# Patient Record
Sex: FEMALE | Race: WHITE | NOT HISPANIC OR LATINO | Employment: UNEMPLOYED | URBAN - NONMETROPOLITAN AREA
[De-identification: names, ages, dates, MRNs, and addresses within clinical notes are randomized per-mention and may not be internally consistent; named-entity substitution may affect disease eponyms.]

---
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
## 2019-03-26 DIAGNOSIS — F98.8 ATTENTION DEFICIT DISORDER (ADD) WITHOUT HYPERACTIVITY: ICD-10-CM

## 2019-03-26 RX ORDER — LEVONORGESTREL AND ETHINYL ESTRADIOL 0.1-0.02MG
1 KIT ORAL DAILY
Qty: 1 PACKET | Refills: 5 | Status: SHIPPED | OUTPATIENT
Start: 2019-03-26 | End: 2019-05-09 | Stop reason: SDUPTHER

## 2019-05-10 RX ORDER — LEVONORGESTREL AND ETHINYL ESTRADIOL 0.1-0.02MG
1 KIT ORAL DAILY
Qty: 1 PACKET | Refills: 3 | Status: SHIPPED | OUTPATIENT
Start: 2019-05-10 | End: 2019-05-14 | Stop reason: SDUPTHER

## 2019-05-13 ENCOUNTER — OFFICE VISIT (OUTPATIENT)
Dept: INTERNAL MEDICINE | Age: 19
End: 2019-05-13
Payer: COMMERCIAL

## 2019-05-13 VITALS
WEIGHT: 144 LBS | OXYGEN SATURATION: 99 % | HEIGHT: 63 IN | HEART RATE: 63 BPM | SYSTOLIC BLOOD PRESSURE: 100 MMHG | BODY MASS INDEX: 25.52 KG/M2 | RESPIRATION RATE: 18 BRPM | DIASTOLIC BLOOD PRESSURE: 62 MMHG

## 2019-05-13 DIAGNOSIS — F98.8 ATTENTION DEFICIT DISORDER (ADD) WITHOUT HYPERACTIVITY: Primary | ICD-10-CM

## 2019-05-13 LAB
AMPHETAMINE SCREEN, URINE: NORMAL
BARBITURATE SCREEN, URINE: NORMAL
BENZODIAZEPINE SCREEN, URINE: NORMAL
BUPRENORPHINE URINE: NORMAL
COCAINE METABOLITE SCREEN URINE: NORMAL
GABAPENTIN SCREEN, URINE: NORMAL
MDMA URINE: NORMAL
METHADONE SCREEN, URINE: NORMAL
METHAMPHETAMINE, URINE: NORMAL
OPIATE SCREEN URINE: NORMAL
OXYCODONE SCREEN URINE: NORMAL
PHENCYCLIDINE SCREEN URINE: NORMAL
PROPOXYPHENE SCREEN, URINE: NORMAL
THC SCREEN, URINE: NORMAL
TRICYCLIC ANTIDEPRESSANTS, UR: NORMAL

## 2019-05-13 PROCEDURE — 99213 OFFICE O/P EST LOW 20 MIN: CPT | Performed by: INTERNAL MEDICINE

## 2019-05-13 PROCEDURE — 80305 DRUG TEST PRSMV DIR OPT OBS: CPT | Performed by: INTERNAL MEDICINE

## 2019-05-13 ASSESSMENT — ENCOUNTER SYMPTOMS
CONSTIPATION: 0
COUGH: 0
ABDOMINAL PAIN: 0
SORE THROAT: 0
CHEST TIGHTNESS: 0
WHEEZING: 0

## 2019-05-13 NOTE — LETTER
disease. Overdose or dangerous interactions with alcohol and other medications may occur, leading to death. Hyperalgesia may develop, in which patients receiving opioids for the treatment of pain may actually become more sensitive to certain painful stimuli, and in some cases, experience pain from ordinarily non-painful stimuli. Women between the ages of 14-53 who could become pregnant should carefully weigh the risks and benefits of opioids with their physicians, as these medications increase the risk of pregnancy complications, including miscarriage,  delivery and stillbirth. It is also possible for babies to be born addicted to opioids. Opioid dependence withdrawal symptoms may include; feelings of uneasiness, increased pain, irritability, belly pain, diarrhea, sweats and goose-flesh. Benzodiazepines and non-benzodiazepine sleep medications: These medications can lead to problems such as addiction/dependence, sedation, fatigue, lightheadedness, dizziness, incoordination, falls, depression, hallucinations, and impaired judgment, memory and concentration. The ability to drive and operate machinery may also be affected. Abnormal sleep-related behaviors have been reported, including sleep walking, driving, making telephone calls, eating, or having sex while not fully awake. These medications can suppress breathing and worsen sleep apnea, particularly when combined with alcohol or other sedating medications, potentially leading to death. Dependence withdrawal symptoms may include tremors, anxiety, hallucinations and seizures. Stimulants:  Common adverse effects include addiction/dependence, increased blood pressure and heart rate, decreased appetite, nausea, involuntary weight loss, insomnia, irritability, and headaches.   These risks may increase when these medications are combined with other stimulants, such as caffeine pills or energy drinks, certain weight loss · I will actively participate in any program designed to improve function, including social, physical, psychological and daily or work activities. 2. I will not use illegal or street drugs or another person's prescription. If I have an addiction problem with drugs or alcohol and my provider asks me to enter a program to address this issue, I agree to follow through. Such programs may include:  · 12-Step program and securing a sponsor  · Individual counseling   · Inpatient or outpatient treatment  · Other:_____________________________________________________________________________________________________________________________________________    If in treatment, I will request that a copy of the programs initial evaluation and treatment recommendations be sent to this provider and will not expect refills until that is received. I will also request written monthly updates be sent to this provider to verify my continuing treatment. 3. I will consent to drug screening upon my providers request to assure I am only taking the prescribed drugs, described in this MEDICATION AGREEMENT. I understand that a drug screen is a laboratory test in which a sample of my urine, blood or saliva is checked to see what drugs I have been taking. 4. I agree that I will treat the providers and staff at this office with respect at all times. I will keep all of my scheduled appointments, but if I need to cancel my appointment, I will do so a minimum of 24 hours before it is scheduled. 5. I understand that this provider may stop prescribing the medications listed if:  · I do not show any improvement in pain, or my activity has not improved. · I develop rapid tolerance or loss of improvement, as described in my treatment plan. · I develop significant side effects from the medication.   · My behavior is inconsistent with the responsibilities outlined above, which may also result in my being prevented from receiving further care from this office. · Other:____________________________________________________________________    AGREEMENT:    I have read the above and have had all of my questions answered. For chronic disease management, I know that my symptoms can be managed with many types of treatments. A chronic medication trial may be part of my treatment, but I must be an active participant in my care. Medication therapy is only one part of my symptom management plan. In some cases, there may be limited scientific evidence to support the chronic use of certain medications to improve symptoms and daily function. Furthermore, in certain circumstances, there may be scientific information that suggests that use of chronic controlled substances may actually worsen my symptoms and increase my risk of unintentional death directly related to this medication therapy. I know that if my provider feels my risk from controlled medications is greater than my benefit, I will have my controlled substance medication(s) compassionately lowered or removed altogether. I agree to a controlled substance medication trial.      I further agree to allow this office to contact my HIPAA contact on file if there are concerns about my safety and use of controlled medications. I have agreed to use the following medications above as instructed by my physician and as stated in this [place] [address].      Patient Signature:  ______________________  Date:5/13/2019 or _____________    Provider Signature:______________________  Date:5/13/2019 or _____________

## 2019-05-13 NOTE — PROGRESS NOTES
Chief Complaint:   Loi Nathan is a 22 y.o. female who presents forcomplete physical exam.    History of Present Illness:      Loi Nathan is a 22 y.o. female who presents todayfor wellness visit AND follow up on her chronic medical conditions as noted below. Here FU ADD  Doing well at school  Vyvanse is helping      Patient Active Problem List    Diagnosis Date Noted    Attention deficit disorder (ADD) without hyperactivity 08/09/2018    Need for vaccination 08/09/2018       No past medical history on file. Past Surgical History:   Procedure Laterality Date    ARM SURGERY      FEMUR FRACTURE SURGERY  2014    fell from horse       Current Outpatient Medications   Medication Sig Dispense Refill    Lisdexamfetamine Dimesylate (VYVANSE) 40 MG CAPS Take 40 mg by mouth daily for 31 days. 30 capsule 0    levonorgestrel-ethinyl estradiol (LESSINA) 0.1-20 MG-MCG per tablet Take 1 tablet by mouth daily 1 packet 3     No current facility-administered medications for this visit. No Known Allergies    Social History     Socioeconomic History    Marital status: Single     Spouse name: None    Number of children: None    Years of education: None    Highest education level: None   Occupational History    None   Social Needs    Financial resource strain: None    Food insecurity:     Worry: None     Inability: None    Transportation needs:     Medical: None     Non-medical: None   Tobacco Use    Smoking status: Never Smoker    Smokeless tobacco: Never Used   Substance and Sexual Activity    Alcohol use:  Yes    Drug use: No    Sexual activity: Never   Lifestyle    Physical activity:     Days per week: None     Minutes per session: None    Stress: None   Relationships    Social connections:     Talks on phone: None     Gets together: None     Attends Shinto service: None     Active member of club or organization: None     Attends meetings of clubs or organizations: None     Relationship status: None    Intimate partner violence:     Fear of current or ex partner: None     Emotionally abused: None     Physically abused: None     Forced sexual activity: None   Other Topics Concern    None   Social History Narrative    None     No family history on file. Past Surgical History:   Procedure Laterality Date    ARM SURGERY      FEMUR FRACTURE SURGERY  2014    fell from horse         Lab Review   No visits with results within 2 Month(s) from this visit. Latest known visit with results is:   Orders Only on 08/09/2018   Component Date Value    Sodium 08/09/2018 142     Potassium 08/09/2018 4.8     Chloride 08/09/2018 101     CO2 08/09/2018 27     Anion Gap 08/09/2018 14     Glucose 08/09/2018 91     BUN 08/09/2018 7     CREATININE 08/09/2018 0.6     GFR Non- 08/09/2018 >60     Calcium 08/09/2018 9.9     Total Protein 08/09/2018 8.0     Alb 08/09/2018 4.7     Total Bilirubin 08/09/2018 <0.2     Alkaline Phosphatase 08/09/2018 85     ALT 08/09/2018 10     AST 08/09/2018 14     WBC 08/09/2018 12.0*    RBC 08/09/2018 5.11     Hemoglobin 08/09/2018 13.9     Hematocrit 08/09/2018 44.3     MCV 08/09/2018 86.7     MCH 08/09/2018 27.2     MCHC 08/09/2018 31.4*    RDW 08/09/2018 13.0     Platelets 52/20/4615 320     MPV 08/09/2018 9.3*         Review of Systems   Constitutional: Negative for chills, fatigue and fever. HENT: Negative for congestion, ear pain, nosebleeds, postnasal drip and sore throat. Respiratory: Negative for cough, chest tightness and wheezing. Cardiovascular: Negative for chest pain, palpitations and leg swelling. Gastrointestinal: Negative for abdominal pain and constipation. Genitourinary: Negative for dysuria and urgency. Musculoskeletal: Negative. Negative for arthralgias. Skin: Negative for rash. Neurological: Negative for dizziness and headaches. Psychiatric/Behavioral: Negative.            Vitals:    05/13/19 1119   BP: 100/62   Site: Left Upper Arm   Position: Sitting   Cuff Size: Large Adult   Pulse: 63   Resp: 18   SpO2: 99%   Weight: 144 lb (65.3 kg)   Height: 5' 3\" (1.6 m)      Wt Readings from Last 3 Encounters:   05/13/19 144 lb (65.3 kg) (75 %, Z= 0.67)*   12/14/18 140 lb (63.5 kg) (71 %, Z= 0.57)*   08/09/18 149 lb (67.6 kg) (82 %, Z= 0.91)*     * Growth percentiles are based on CDC (Girls, 2-20 Years) data. Body mass index is 25.51 kg/m². BP Readings from Last 3 Encounters:   05/13/19 100/62   12/14/18 110/70   08/09/18 100/60       Physical Exam   Constitutional: She is oriented to person, place, and time. She appears well-developed and well-nourished. HENT:   Right Ear: External ear normal.   Left Ear: External ear normal.   Mouth/Throat: Oropharynx is clear and moist. No oropharyngeal exudate. Eyes: Pupils are equal, round, and reactive to light. Conjunctivae are normal.   Neck: Neck supple. No JVD present. No thyromegaly present. Cardiovascular: Normal rate and normal heart sounds. No murmur heard. Pulmonary/Chest: Breath sounds normal. No respiratory distress. She has no wheezes. She has no rales. She exhibits no tenderness. Abdominal: Soft. Bowel sounds are normal.   Musculoskeletal: Normal range of motion. Lymphadenopathy:     She has no cervical adenopathy. Neurological: She is oriented to person, place, and time. Skin: Skin is warm. No rash noted. ASSESSMENT/PLAN    Attention deficit disorder (ADD) without hyperactivity  -     POCT Rapid Drug Screen today- negative ( pt has been   out of rx over one week)  Gianna Shannon reviewed  RX  -     Lisdexamfetamine Dimesylate (VYVANSE) 40 MG CAPS; Take 40 mg by mouth daily for 31 days. .     Other orders  -     levonorgestrel-ethinyl estradiol (LESSINA) 0.1-20 MG-MCG per tablet;  Take 1 tablet by mouth daily            Orders Placed This Encounter   Procedures    POCT Rapid Drug Screen     New Prescriptions    No medications on file      There are no Patient Instructions on file for this visit. Return in about 3 months (around 8/13/2019). EMR Dragon/transcription disclaimer:Significant part of this  encounter note is electronic transcription/translation of spoken language to printed text. The electronic translation of spoken language may beerroneous, or at times, nonsensical words or phrases may be inadvertently transcribed.  Although I have reviewed the note for such errors, some may still exist.

## 2019-05-14 RX ORDER — LEVONORGESTREL AND ETHINYL ESTRADIOL 0.1-0.02MG
1 KIT ORAL DAILY
Qty: 1 PACKET | Refills: 3 | Status: SHIPPED | OUTPATIENT
Start: 2019-05-14 | End: 2019-06-07 | Stop reason: SDUPTHER

## 2019-06-10 RX ORDER — LEVONORGESTREL AND ETHINYL ESTRADIOL 0.1-0.02MG
1 KIT ORAL DAILY
Qty: 1 PACKET | Refills: 3 | Status: SHIPPED | OUTPATIENT
Start: 2019-06-10 | End: 2019-11-22 | Stop reason: SDUPTHER

## 2019-07-08 ENCOUNTER — OFFICE VISIT (OUTPATIENT)
Dept: INTERNAL MEDICINE | Age: 19
End: 2019-07-08
Payer: COMMERCIAL

## 2019-07-08 VITALS
HEIGHT: 63 IN | OXYGEN SATURATION: 96 % | HEART RATE: 99 BPM | SYSTOLIC BLOOD PRESSURE: 100 MMHG | BODY MASS INDEX: 27.11 KG/M2 | DIASTOLIC BLOOD PRESSURE: 58 MMHG | RESPIRATION RATE: 18 BRPM | WEIGHT: 153 LBS

## 2019-07-08 DIAGNOSIS — R49.0 HOARSE: ICD-10-CM

## 2019-07-08 DIAGNOSIS — J02.9 SORE THROAT: Primary | ICD-10-CM

## 2019-07-08 DIAGNOSIS — J02.9 ACUTE PHARYNGITIS, UNSPECIFIED ETIOLOGY: ICD-10-CM

## 2019-07-08 PROCEDURE — 96372 THER/PROPH/DIAG INJ SC/IM: CPT | Performed by: INTERNAL MEDICINE

## 2019-07-08 PROCEDURE — 99213 OFFICE O/P EST LOW 20 MIN: CPT | Performed by: INTERNAL MEDICINE

## 2019-07-08 PROCEDURE — 87880 STREP A ASSAY W/OPTIC: CPT | Performed by: INTERNAL MEDICINE

## 2019-07-08 RX ORDER — METHYLPREDNISOLONE ACETATE 40 MG/ML
40 INJECTION, SUSPENSION INTRA-ARTICULAR; INTRALESIONAL; INTRAMUSCULAR; SOFT TISSUE ONCE
Status: COMPLETED | OUTPATIENT
Start: 2019-07-08 | End: 2019-07-08

## 2019-07-08 RX ADMIN — METHYLPREDNISOLONE ACETATE 40 MG: 40 INJECTION, SUSPENSION INTRA-ARTICULAR; INTRALESIONAL; INTRAMUSCULAR; SOFT TISSUE at 13:27

## 2019-07-08 ASSESSMENT — ENCOUNTER SYMPTOMS
SORE THROAT: 1
ABDOMINAL PAIN: 0
CONSTIPATION: 0
CHEST TIGHTNESS: 0
WHEEZING: 0
COUGH: 0
VOICE CHANGE: 1
TROUBLE SWALLOWING: 1

## 2019-07-08 NOTE — PROGRESS NOTES
Chief Complaint:   Jahaira Rodriguez is a 22 y.o. female  [address] [place]   Chief Complaint   Patient presents with    Pharyngitis     started 3 days ago   . History of Present Illness:      Presents today with complaints of sore throat, voice changes, difficult to swallow some congestion  Low-grade fever  No chills  No cough  Patient Active Problem List    Diagnosis Date Noted    Attention deficit disorder (ADD) without hyperactivity 08/09/2018    Need for vaccination 08/09/2018       No past medical history on file. Past Surgical History:   Procedure Laterality Date    ARM SURGERY      FEMUR FRACTURE SURGERY  2014    fell from horse       Current Outpatient Medications   Medication Sig Dispense Refill    levonorgestrel-ethinyl estradiol (LESSINA) 0.1-20 MG-MCG per tablet Take 1 tablet by mouth daily 1 packet 3    Lisdexamfetamine Dimesylate (VYVANSE) 40 MG CAPS Take 40 mg by mouth daily for 31 days. 30 capsule 0     No current facility-administered medications for this visit. No Known Allergies    Social History     Socioeconomic History    Marital status: Single     Spouse name: None    Number of children: None    Years of education: None    Highest education level: None   Occupational History    None   Social Needs    Financial resource strain: None    Food insecurity:     Worry: None     Inability: None    Transportation needs:     Medical: None     Non-medical: None   Tobacco Use    Smoking status: Never Smoker    Smokeless tobacco: Never Used   Substance and Sexual Activity    Alcohol use:  Yes    Drug use: No    Sexual activity: Never   Lifestyle    Physical activity:     Days per week: None     Minutes per session: None    Stress: None   Relationships    Social connections:     Talks on phone: None     Gets together: None     Attends Mormonism service: None     Active member of club or organization: None     Attends meetings of clubs or organizations: None     Relationship status: None

## 2019-07-09 RX ORDER — AMOXICILLIN 500 MG/1
500 CAPSULE ORAL 3 TIMES DAILY
Qty: 21 CAPSULE | Refills: 0 | Status: SHIPPED | OUTPATIENT
Start: 2019-07-09 | End: 2019-07-16

## 2019-08-09 ENCOUNTER — OFFICE VISIT (OUTPATIENT)
Dept: INTERNAL MEDICINE | Age: 19
End: 2019-08-09
Payer: COMMERCIAL

## 2019-08-09 VITALS
HEART RATE: 89 BPM | DIASTOLIC BLOOD PRESSURE: 80 MMHG | SYSTOLIC BLOOD PRESSURE: 122 MMHG | BODY MASS INDEX: 27.11 KG/M2 | HEIGHT: 63 IN | WEIGHT: 153 LBS | OXYGEN SATURATION: 97 %

## 2019-08-09 DIAGNOSIS — F98.8 ATTENTION DEFICIT DISORDER (ADD) WITHOUT HYPERACTIVITY: Primary | ICD-10-CM

## 2019-08-09 DIAGNOSIS — B00.1 HERPES LABIALIS: ICD-10-CM

## 2019-08-09 PROCEDURE — 99213 OFFICE O/P EST LOW 20 MIN: CPT | Performed by: INTERNAL MEDICINE

## 2019-08-09 RX ORDER — VALACYCLOVIR HYDROCHLORIDE 1 G/1
1000 TABLET, FILM COATED ORAL 3 TIMES DAILY
Qty: 21 TABLET | Refills: 0 | Status: SHIPPED | OUTPATIENT
Start: 2019-08-09 | End: 2020-03-12 | Stop reason: SDUPTHER

## 2019-08-09 ASSESSMENT — ENCOUNTER SYMPTOMS
ABDOMINAL PAIN: 0
WHEEZING: 0
SORE THROAT: 0
CHEST TIGHTNESS: 0
COUGH: 0
CONSTIPATION: 0

## 2019-08-09 ASSESSMENT — PATIENT HEALTH QUESTIONNAIRE - PHQ9
2. FEELING DOWN, DEPRESSED OR HOPELESS: 0
SUM OF ALL RESPONSES TO PHQ9 QUESTIONS 1 & 2: 0
1. LITTLE INTEREST OR PLEASURE IN DOING THINGS: 0
SUM OF ALL RESPONSES TO PHQ QUESTIONS 1-9: 0
SUM OF ALL RESPONSES TO PHQ QUESTIONS 1-9: 0

## 2019-10-21 DIAGNOSIS — F98.8 ATTENTION DEFICIT DISORDER (ADD) WITHOUT HYPERACTIVITY: ICD-10-CM

## 2019-11-22 RX ORDER — LEVONORGESTREL AND ETHINYL ESTRADIOL 0.1-0.02MG
1 KIT ORAL DAILY
Qty: 1 PACKET | Refills: 3 | Status: SHIPPED | OUTPATIENT
Start: 2019-11-22 | End: 2020-03-12 | Stop reason: SDUPTHER

## 2019-11-27 ENCOUNTER — OFFICE VISIT (OUTPATIENT)
Dept: INTERNAL MEDICINE | Age: 19
End: 2019-11-27
Payer: COMMERCIAL

## 2019-11-27 VITALS
OXYGEN SATURATION: 97 % | DIASTOLIC BLOOD PRESSURE: 72 MMHG | RESPIRATION RATE: 18 BRPM | HEART RATE: 89 BPM | HEIGHT: 63 IN | BODY MASS INDEX: 26.22 KG/M2 | SYSTOLIC BLOOD PRESSURE: 108 MMHG | WEIGHT: 148 LBS

## 2019-11-27 DIAGNOSIS — F98.8 ATTENTION DEFICIT DISORDER (ADD) WITHOUT HYPERACTIVITY: ICD-10-CM

## 2019-11-27 PROCEDURE — 99213 OFFICE O/P EST LOW 20 MIN: CPT | Performed by: INTERNAL MEDICINE

## 2019-11-27 ASSESSMENT — ENCOUNTER SYMPTOMS
CONSTIPATION: 0
WHEEZING: 0
COUGH: 0
SORE THROAT: 0
ABDOMINAL PAIN: 0
CHEST TIGHTNESS: 0

## 2020-01-27 ENCOUNTER — TELEPHONE (OUTPATIENT)
Dept: INTERNAL MEDICINE | Age: 20
End: 2020-01-27

## 2020-03-12 ENCOUNTER — OFFICE VISIT (OUTPATIENT)
Dept: INTERNAL MEDICINE | Age: 20
End: 2020-03-12
Payer: COMMERCIAL

## 2020-03-12 VITALS
RESPIRATION RATE: 18 BRPM | HEIGHT: 63 IN | DIASTOLIC BLOOD PRESSURE: 80 MMHG | OXYGEN SATURATION: 99 % | WEIGHT: 142 LBS | BODY MASS INDEX: 25.16 KG/M2 | HEART RATE: 80 BPM | SYSTOLIC BLOOD PRESSURE: 128 MMHG

## 2020-03-12 PROCEDURE — 99213 OFFICE O/P EST LOW 20 MIN: CPT | Performed by: INTERNAL MEDICINE

## 2020-03-12 RX ORDER — VALACYCLOVIR HYDROCHLORIDE 1 G/1
1000 TABLET, FILM COATED ORAL 3 TIMES DAILY
Qty: 21 TABLET | Refills: 0 | Status: SHIPPED | OUTPATIENT
Start: 2020-03-12 | End: 2021-07-26 | Stop reason: SDUPTHER

## 2020-03-12 RX ORDER — LEVONORGESTREL AND ETHINYL ESTRADIOL 0.1-0.02MG
1 KIT ORAL DAILY
Qty: 3 PACKET | Refills: 3 | Status: SHIPPED | OUTPATIENT
Start: 2020-03-12 | End: 2020-09-29 | Stop reason: SDUPTHER

## 2020-03-12 RX ORDER — LISDEXAMFETAMINE DIMESYLATE 40 MG/1
40 CAPSULE ORAL DAILY
Qty: 30 CAPSULE | Refills: 0 | Status: SHIPPED | OUTPATIENT
Start: 2020-03-12 | End: 2020-03-12 | Stop reason: SDUPTHER

## 2020-03-12 RX ORDER — LISDEXAMFETAMINE DIMESYLATE 40 MG/1
40 CAPSULE ORAL DAILY
Qty: 30 CAPSULE | Refills: 0 | Status: SHIPPED | OUTPATIENT
Start: 2020-03-12 | End: 2020-06-25 | Stop reason: SDUPTHER

## 2020-03-12 ASSESSMENT — ENCOUNTER SYMPTOMS
SORE THROAT: 0
COUGH: 0
ABDOMINAL PAIN: 0
WHEEZING: 0
CHEST TIGHTNESS: 0
CONSTIPATION: 0

## 2020-03-12 NOTE — PROGRESS NOTES
Sitting   Cuff Size: Large Adult   Pulse: 80   Resp: 18   SpO2: 99%   Weight: 142 lb (64.4 kg)   Height: 5' 3\" (1.6 m)     Body mass index is 25.15 kg/m². Physical Exam  Constitutional:       Appearance: She is well-developed. HENT:      Right Ear: External ear normal.      Left Ear: External ear normal.      Mouth/Throat:      Pharynx: No oropharyngeal exudate. Eyes:      Conjunctiva/sclera: Conjunctivae normal.      Pupils: Pupils are equal, round, and reactive to light. Neck:      Musculoskeletal: Neck supple. Thyroid: No thyromegaly. Vascular: No JVD. Cardiovascular:      Rate and Rhythm: Normal rate. Heart sounds: Normal heart sounds. No murmur. Pulmonary:      Effort: No respiratory distress. Breath sounds: Normal breath sounds. No wheezing or rales. Chest:      Chest wall: No tenderness. Abdominal:      General: Bowel sounds are normal.      Palpations: Abdomen is soft. Musculoskeletal: Normal range of motion. Lymphadenopathy:      Cervical: No cervical adenopathy. Skin:     General: Skin is warm. Findings: No rash. Neurological:      Mental Status: She is oriented to person, place, and time. Lab Review   No visits with results within 2 Month(s) from this visit.    Latest known visit with results is:   Office Visit on 05/13/2019   Component Date Value    Amphetamine Screen, Urine 05/13/2019 neg     Barbiturate Screen, Urine 05/13/2019 neg     Benzodiazepine Screen, U* 05/13/2019 neg     Buprenorphine Urine 05/13/2019 neg     Cocaine Metabolite Scree* 05/13/2019 neg     Gabapentin Screen, Urine 05/13/2019 neg     MDMA, Urine 05/13/2019 neg     Methamphetamine, Urine 05/13/2019 neg     Methadone Screen, Urine 05/13/2019 neg     Opiate Scrn, Ur 05/13/2019 neg     Oxycodone Screen, Ur 05/13/2019 neg     PCP Screen, Urine 05/13/2019 neg     Propoxyphene Screen, Uri* 05/13/2019 neg     THC Screen, Urine 95/04/4052 neg     Tricyclic Antidepressant* 05/13/2019 neg            ASSESSMENT/PLAN:    Attention deficit disorder (ADD) without hyperactivity  Walker Lyn 03/12/2020  Med Contract 05/13/2019  UDS 02/29/3324    Med Policy 36/91/1316    -     Lisdexamfetamine Dimesylate (VYVANSE) 40 MG CAPS; Take 40 mg by mouth daily for 30 days. Other orders  -     valACYclovir (VALTREX) 1 g tablet; Take 1 tablet by mouth 3 times daily for 21 days  -     levonorgestrel-ethinyl estradiol (LESSINA) 0.1-20 MG-MCG per tablet; Take 1 tablet by mouth daily              No orders of the defined types were placed in this encounter. New Prescriptions    No medications on file         No follow-ups on file. There are no Patient Instructions on file for this visit. EMR Dragon/transcription disclaimer:Significant part of this  encounter note is electronic transcription/translationof spoken language to printed text. The electronic translation of spoken language may be erroneous, or at times, nonsensical words or phrases may be inadvertently transcribed.  Although I have reviewed the note for sucherrors, some may still exist.

## 2020-06-24 ENCOUNTER — TELEPHONE (OUTPATIENT)
Dept: INTERNAL MEDICINE | Age: 20
End: 2020-06-24

## 2020-06-24 RX ORDER — LISDEXAMFETAMINE DIMESYLATE 40 MG/1
40 CAPSULE ORAL DAILY
Qty: 30 CAPSULE | Refills: 0 | Status: CANCELLED | OUTPATIENT
Start: 2020-06-24 | End: 2020-07-24

## 2020-06-24 NOTE — TELEPHONE ENCOUNTER
Yvette Perez called to request a refill on her medication. Last office visit : 3/12/2020   Next office visit : Visit date not found     Last UDS:   Amphetamine Screen, Urine   Date Value Ref Range Status   05/13/2019 neg  Final     Barbiturate Screen, Urine   Date Value Ref Range Status   05/13/2019 neg  Final     Benzodiazepine Screen, Urine   Date Value Ref Range Status   05/13/2019 neg  Final     Buprenorphine Urine   Date Value Ref Range Status   05/13/2019 neg  Final     Cocaine Metabolite Screen, Urine   Date Value Ref Range Status   05/13/2019 neg  Final     Gabapentin Screen, Urine   Date Value Ref Range Status   05/13/2019 neg  Final     MDMA, Urine   Date Value Ref Range Status   05/13/2019 neg  Final     Methamphetamine, Urine   Date Value Ref Range Status   05/13/2019 neg  Final     Opiate Scrn, Ur   Date Value Ref Range Status   05/13/2019 neg  Final     Oxycodone Screen, Ur   Date Value Ref Range Status   05/13/2019 neg  Final     PCP Screen, Urine   Date Value Ref Range Status   05/13/2019 neg  Final     Propoxyphene Screen, Urine   Date Value Ref Range Status   05/13/2019 neg  Final     THC Screen, Urine   Date Value Ref Range Status   05/13/2019 neg  Final     Tricyclic Antidepressants, Urine   Date Value Ref Range Status   05/13/2019 neg  Final       Last Sheila Hemphill: 03/12/2020  Medication Contract: 05/13/2019    Requested Prescriptions     Pending Prescriptions Disp Refills    Lisdexamfetamine Dimesylate (VYVANSE) 40 MG CAPS 30 capsule 0     Sig: Take 40 mg by mouth daily for 30 days. Please approve or refuse this medication.    Adalberto Espino

## 2020-06-25 ENCOUNTER — OFFICE VISIT (OUTPATIENT)
Dept: INTERNAL MEDICINE | Age: 20
End: 2020-06-25
Payer: COMMERCIAL

## 2020-06-25 VITALS
OXYGEN SATURATION: 99 % | HEIGHT: 63 IN | BODY MASS INDEX: 26.58 KG/M2 | HEART RATE: 107 BPM | SYSTOLIC BLOOD PRESSURE: 118 MMHG | DIASTOLIC BLOOD PRESSURE: 80 MMHG | WEIGHT: 150 LBS

## 2020-06-25 PROCEDURE — 80305 DRUG TEST PRSMV DIR OPT OBS: CPT | Performed by: INTERNAL MEDICINE

## 2020-06-25 PROCEDURE — 99213 OFFICE O/P EST LOW 20 MIN: CPT | Performed by: INTERNAL MEDICINE

## 2020-06-25 RX ORDER — LISDEXAMFETAMINE DIMESYLATE 40 MG/1
40 CAPSULE ORAL DAILY
Qty: 30 CAPSULE | Refills: 0 | Status: SHIPPED | OUTPATIENT
Start: 2020-06-25 | End: 2020-06-25 | Stop reason: SDUPTHER

## 2020-06-25 RX ORDER — LISDEXAMFETAMINE DIMESYLATE 40 MG/1
40 CAPSULE ORAL DAILY
Qty: 30 CAPSULE | Refills: 0 | Status: SHIPPED | OUTPATIENT
Start: 2020-06-25 | End: 2020-09-29 | Stop reason: SDUPTHER

## 2020-06-25 ASSESSMENT — PATIENT HEALTH QUESTIONNAIRE - PHQ9
SUM OF ALL RESPONSES TO PHQ QUESTIONS 1-9: 0
SUM OF ALL RESPONSES TO PHQ QUESTIONS 1-9: 0
1. LITTLE INTEREST OR PLEASURE IN DOING THINGS: 0
SUM OF ALL RESPONSES TO PHQ9 QUESTIONS 1 & 2: 0
2. FEELING DOWN, DEPRESSED OR HOPELESS: 0

## 2020-06-25 ASSESSMENT — ENCOUNTER SYMPTOMS
WHEEZING: 0
ABDOMINAL PAIN: 0
COUGH: 0
CHEST TIGHTNESS: 0
SORE THROAT: 0
CONSTIPATION: 0

## 2020-06-25 NOTE — PROGRESS NOTES
40 MG CAPS; Take 40 mg by mouth daily for 30 days.  -     POCT Rapid Drug Screen  Moriah Bass 06/25/2020  Med Contract 05/13/2019  S [id number]    Med Policy 94/78/5420      Orders Placed This Encounter   Procedures    POCT Rapid Drug Screen     New Prescriptions    No medications on file         No follow-ups on file. There are no Patient Instructions on file for this visit. EMR Dragon/transcription disclaimer:Significant part of this  encounter note is electronic transcription/translationof spoken language to printed text. The electronic translation of spoken language may be erroneous, or at times, nonsensical words or phrases may be inadvertently transcribed.  Although I have reviewed the note for sucherrors, some may still exist.

## 2020-09-29 ENCOUNTER — TELEMEDICINE (OUTPATIENT)
Dept: INTERNAL MEDICINE | Age: 20
End: 2020-09-29
Payer: COMMERCIAL

## 2020-09-29 PROCEDURE — 99213 OFFICE O/P EST LOW 20 MIN: CPT | Performed by: INTERNAL MEDICINE

## 2020-09-29 RX ORDER — LISDEXAMFETAMINE DIMESYLATE 40 MG/1
40 CAPSULE ORAL DAILY
Qty: 30 CAPSULE | Refills: 0 | Status: SHIPPED | OUTPATIENT
Start: 2020-09-29 | End: 2020-10-02 | Stop reason: SDUPTHER

## 2020-09-29 RX ORDER — LEVONORGESTREL AND ETHINYL ESTRADIOL 0.1-0.02MG
1 KIT ORAL DAILY
Qty: 3 PACKET | Refills: 3 | Status: SHIPPED | OUTPATIENT
Start: 2020-09-29 | End: 2021-03-01 | Stop reason: SDUPTHER

## 2020-09-29 ASSESSMENT — ENCOUNTER SYMPTOMS
CHEST TIGHTNESS: 0
WHEEZING: 0
SORE THROAT: 0
COUGH: 0
ABDOMINAL PAIN: 0
CONSTIPATION: 0

## 2020-09-29 ASSESSMENT — PATIENT HEALTH QUESTIONNAIRE - PHQ9
SUM OF ALL RESPONSES TO PHQ9 QUESTIONS 1 & 2: 0
SUM OF ALL RESPONSES TO PHQ QUESTIONS 1-9: 0
1. LITTLE INTEREST OR PLEASURE IN DOING THINGS: 0
SUM OF ALL RESPONSES TO PHQ QUESTIONS 1-9: 0
2. FEELING DOWN, DEPRESSED OR HOPELESS: 0

## 2020-09-29 NOTE — PROGRESS NOTES
Chief Complaint   Patient presents with    Medication Refill     History of presenting illness:  Tish Brown is a18 y.o. female     TELEHEALTH EVALUATION -- Audio/Visual (During SVBTZ-87 public health emergency)  Patient location-home  Pursuant to the emergency declaration under the [facility] [place] [address] [facility] authority and the [employer] Act, this Virtual  Visit was conducted, with patient's consent, to reduce the patient's risk of exposure to COVID-19 and provide continuity of care for an established patient. Services were provided through a video synchronous discussion virtually to substitute for in-person clinic visit. Reason for VV:    FU ADD  Doing well on current rx  She is currently 30-year at [employer] program  Has been doing well on her tests/progressing well      Patient Active Problem List    Diagnosis Date Noted    Attention deficit disorder (ADD) without hyperactivity 08/09/2018    Need for vaccination 08/09/2018     No past medical history on file. Past Surgical History:   Procedure Laterality Date    ARM SURGERY      FEMUR FRACTURE SURGERY  2014    f[place] from horse     Current Outpatient Medications   Medication Sig Dispense Refill    Lisdexamfetamine Dimesylate (VYVANSE) 40 MG CAPS Take 40 mg by mouth daily for 30 days. 30 capsule 0    levonorgestrel-ethinyl estradiol (LESSINA) 0.1-20 MG-MCG per tablet Take 1 tablet by mouth daily 3 packet 3     No current facility-administered medications for this visit. No Known Allergies  Social History     Tobacco Use    Smoking status: Never Smoker    Smokeless tobacco: Never Used   Substance Use Topics    Alcohol use: Yes      No family history on file. Review of Systems   Constitutional: Negative for chills, fatigue and fever. HENT: Negative for congestion, ear pain, nosebleeds, postnasal drip and sore throat.     Respiratory: Negative for cough, chest tightness and wheezing. Cardiovascular: Negative for chest pain, palpitations and leg swelling. Gastrointestinal: Negative for abdominal pain and constipation. Genitourinary: Negative for dysuria and urgency. Musculoskeletal: Negative. Negative for arthralgias. Skin: Negative for rash. Neurological: Negative for dizziness and headaches. Psychiatric/Behavioral: Negative. There were no vitals filed for this visit. There is no height or weight on file to calculate BMI. Patient-Reported Vitals 9/29/2020   Patient-Reported Weight 145   Patient-Reported Height 5'3   Patient-Reported Temperature 97        Physical Exam  PHYSICAL EXAMINATION:  [ INSTRUCTIONS:  \"[x]\" Indicates a positive item  \"[]\" Indicates a negative item  -- DELETE ALL ITEMS NOT EXAMINED]  [x] Alert  [x] Oriented to person/place/time    [x] No apparent distress  [] Toxic appearing    [] Face flushed appearing [] Sclera clear  [] Lips are cyanotic      [x] Breathing appears normal  [] Appears tachypneic      [] Rash on visible skin    [x] Cranial Nerves II-XII grossly intact    [x] Motor grossly intact in visible upper extremities    [x] Motor grossly intact in visible lower extremities    [x] Normal Mood  [] Anxious appearing    [] Depressed appearing  [] Confused appearing      [] Poor short term memory  [] Poor long term memory    [] OTHER:      Due to this being a TeleHealth encounter, evaluation of the following organ systems is limited: Vitals/Constitutional/EENT/Resp/CV/GI//MS/Neuro/Skin/Heme-Lymph-Imm. Lab Review   No visits with results within 2 Month(s) from this visit.    Latest known visit with results is:   Office Visit on 06/25/2020   Component Date Value    Amphetamine Screen, Urine 06/25/2020 POS     Barbiturate Screen, Urine 06/25/2020 NEG     Benzodiazepine Screen, U* 06/25/2020 NEG     Buprenorphine Urine 06/25/2020 NEG     Cocaine Metabolite Scree* 06/25/2020 NEG     Gabapentin Screen, Urine 06/25/2020 NEG     MDMA, Urine 06/25/2020 NEG     Methamphetamine, Urine 06/25/2020 NEG     Methadone Screen, Urine 06/25/2020 NEG     Opiate Scrn, Ur 06/25/2020 NEG     Oxycodone Screen, Ur 06/25/2020 NEG     PCP Screen, Urine 06/25/2020 NEG     Propoxyphene Screen, Uri* 06/25/2020 NEG     THC Screen, Urine 19/53/9774 NEG     Tricyclic Antidepressant* 85/10/5260 NEG            ASSESSMENT/PLAN:  Miroslava Guajardo was seen today for medication refill. Diagnoses and all orders for this visit:    Attention deficit disorder (ADD) without hyperactivity    Joe Brandt 09/29/2020  Med Contract 06/25/2020  UDS 06/25/2020      Continue Vyvanse 40 mg daily  Prescription sent electronically to [facility] (VYVANSE) 40 MG CAPS; Take 40 mg by mouth daily for 30 days. Other orders  -     levonorgestrel-ethinyl estradiol (LESSINA) 0.1-20 MG-MCG per tablet; Take 1 tablet by mouth daily              No orders of the defined types were placed in this encounter. New Prescriptions    No medications on file         No follow-ups on file. There are no Patient Instructions on file for this visit. EMR Dragon/transcription disclaimer:Significant part of this  encounter note is electronic transcription/translationof spoken language to printed text. The electronic translation of spoken language may be erroneous, or at times, nonsensical words or phrases may be inadvertently transcribed.  Although I have reviewed the note for sucherrors, some may still exist.

## 2020-10-01 NOTE — TELEPHONE ENCOUNTER
Pt called and left message that her Vyvanse had been sent to the [facility] in [place] she is wanting this sent to the [facility] her in [place].

## 2020-10-02 RX ORDER — LISDEXAMFETAMINE DIMESYLATE 40 MG/1
40 CAPSULE ORAL DAILY
Qty: 30 CAPSULE | Refills: 0 | Status: SHIPPED | OUTPATIENT
Start: 2020-10-02 | End: 2020-11-09 | Stop reason: SDUPTHER

## 2020-10-02 NOTE — TELEPHONE ENCOUNTER
PT came home for fall break before she picked this up while in [place], I have called [facility] and had them cancel this appt on their end so that she can fill it here.

## 2020-11-10 RX ORDER — LISDEXAMFETAMINE DIMESYLATE 40 MG/1
40 CAPSULE ORAL DAILY
Qty: 30 CAPSULE | Refills: 0 | Status: SHIPPED | OUTPATIENT
Start: 2020-11-10 | End: 2020-12-08 | Stop reason: SDUPTHER

## 2020-12-08 ENCOUNTER — OFFICE VISIT (OUTPATIENT)
Dept: INTERNAL MEDICINE | Age: 20
End: 2020-12-08
Payer: COMMERCIAL

## 2020-12-08 VITALS
OXYGEN SATURATION: 98 % | HEIGHT: 63 IN | SYSTOLIC BLOOD PRESSURE: 122 MMHG | WEIGHT: 147 LBS | BODY MASS INDEX: 26.05 KG/M2 | DIASTOLIC BLOOD PRESSURE: 80 MMHG | HEART RATE: 102 BPM

## 2020-12-08 PROCEDURE — 99213 OFFICE O/P EST LOW 20 MIN: CPT | Performed by: INTERNAL MEDICINE

## 2020-12-08 RX ORDER — LISDEXAMFETAMINE DIMESYLATE 40 MG/1
40 CAPSULE ORAL DAILY
Qty: 30 CAPSULE | Refills: 0 | Status: SHIPPED | OUTPATIENT
Start: 2020-12-08 | End: 2021-01-28 | Stop reason: SDUPTHER

## 2020-12-08 ASSESSMENT — ENCOUNTER SYMPTOMS
WHEEZING: 0
COUGH: 0
CONSTIPATION: 0
CHEST TIGHTNESS: 0
ABDOMINAL PAIN: 0
SORE THROAT: 0

## 2020-12-08 NOTE — PROGRESS NOTES
Chief Complaint   Patient presents with    3 Month Follow-Up     History of presenting illness:  Chris Martinez is a22 y.o. female who presents today for follow up on her chronic medical conditions as noted below. FU ADD  Doing well on current rx  She is currently 3-year at [employer] program  Has been doing well on her tests/progressing well  adderall helps with her studies/ attention     Patient Active Problem List    Diagnosis Date Noted    Attention deficit disorder (ADD) without hyperactivity 08/09/2018    Need for vaccination 08/09/2018     No past medical history on file. Past Surgical History:   Procedure Laterality Date    ARM SURGERY      FEMUR FRACTURE SURGERY  2014    fell from horse     Current Outpatient Medications   Medication Sig Dispense Refill    Lisdexamfetamine Dimesylate (VYVANSE) 40 MG CAPS Take 40 mg by mouth daily for 30 days. 30 capsule 0    levonorgestrel-ethinyl estradiol (LESSINA) 0.1-20 MG-MCG per tablet Take 1 tablet by mouth daily 3 packet 3     No current facility-administered medications for this visit. No Known Allergies  Social History     Tobacco Use    Smoking status: Never Smoker    Smokeless tobacco: Never Used   Substance Use Topics    Alcohol use: Yes      No family history on file. Review of Systems   Constitutional: Negative for chills, fatigue and fever. HENT: Negative for congestion, ear pain, nosebleeds, postnasal drip and sore throat. Respiratory: Negative for cough, chest tightness and wheezing. Cardiovascular: Negative for chest pain, palpitations and leg swelling. Gastrointestinal: Negative for abdominal pain and constipation. Genitourinary: Negative for dysuria and urgency. Musculoskeletal: Negative. Negative for arthralgias. Skin: Negative for rash. Neurological: Negative for dizziness and headaches. Psychiatric/Behavioral: Negative.       Vitals:    12/08/20 1318   BP: 122/80   Pulse: 102   SpO2: 98%   Weight: 147 lb (66.7 kg)   Height: 5' 3\" (1.6 m)     Body mass index is 26.04 kg/m². Physical Exam  Constitutional:       Appearance: She is well-developed. HENT:      Right Ear: External ear normal.      Left Ear: External ear normal.      Mouth/Throat:      Pharynx: No oropharyngeal exudate. Eyes:      Conjunctiva/sclera: Conjunctivae normal.      Pupils: Pupils are equal, round, and reactive to light. Neck:      Musculoskeletal: Neck supple. Thyroid: No thyromegaly. Vascular: No JVD. Cardiovascular:      Rate and Rhythm: Normal rate. Heart sounds: Normal heart sounds. No murmur. Pulmonary:      Effort: No respiratory distress. Breath sounds: Normal breath sounds. No wheezing or rales. Chest:      Chest wall: No tenderness. Abdominal:      General: Bowel sounds are normal.      Palpations: Abdomen is soft. Musculoskeletal: Normal range of motion. Lymphadenopathy:      Cervical: No cervical adenopathy. Skin:     General: Skin is warm. Findings: No rash. Neurological:      Mental Status: She is oriented to person, place, and time. Lab Review   No visits with results within 2 Month(s) from this visit.    Latest known visit with results is:   Office Visit on 06/25/2020   Component Date Value    Amphetamine Screen, Urine 06/25/2020 POS     Barbiturate Screen, Urine 06/25/2020 NEG     Benzodiazepine Screen, U* 06/25/2020 NEG     Buprenorphine Urine 06/25/2020 NEG     Cocaine Metabolite Scree* 06/25/2020 NEG     Gabapentin Screen, Urine 06/25/2020 NEG     MDMA, Urine 06/25/2020 NEG     Methamphetamine, Urine 06/25/2020 NEG     Methadone Screen, Urine 06/25/2020 NEG     Opiate Scrn, Ur 06/25/2020 NEG     Oxycodone Screen, Ur 06/25/2020 NEG     PCP Screen, Urine 06/25/2020 NEG     Propoxyphene Screen, Uri* 06/25/2020 NEG     THC Screen, Urine 17/61/2882 NEG     Tricyclic Antidepressant* 02/84/3775 NEG            ASSESSMENT/PLAN:    Attention deficit disorder (ADD) without hyperactivity     Ofe Orellana 12/2020  Med Contract 06/25/2020  UDS 06/25/2020   Continue Vyvanse 40 mg daily    -     Lisdexamfetamine Dimesylate (VYVANSE) 40 MG CAPS; Take 40 mg by mouth daily for 30 days. No orders of the defined types were placed in this encounter. New Prescriptions    No medications on file         No follow-ups on file. There are no Patient Instructions on file for this visit. EMR Dragon/transcription disclaimer:Significant part of this  encounter note is electronic transcription/translationof spoken language to printed text. The electronic translation of spoken language may be erroneous, or at times, nonsensical words or phrases may be inadvertently transcribed.  Although I have reviewed the note for sucherrors, some may still exist.

## 2020-12-11 ENCOUNTER — TELEPHONE (OUTPATIENT)
Dept: INTERNAL MEDICINE | Age: 20
End: 2020-12-11

## 2021-01-28 DIAGNOSIS — F98.8 ATTENTION DEFICIT DISORDER (ADD) WITHOUT HYPERACTIVITY: ICD-10-CM

## 2021-01-28 NOTE — TELEPHONE ENCOUNTER
Jean Pierre Souza called to request a refill on her medication. Last office visit : 12/8/2020   Next office visit : Visit date not found     Last UDS:   Amphetamine Screen, Urine   Date Value Ref Range Status   06/25/2020 POS  Final     Barbiturate Screen, Urine   Date Value Ref Range Status   06/25/2020 NEG  Final     Benzodiazepine Screen, Urine   Date Value Ref Range Status   06/25/2020 NEG  Final     Buprenorphine Urine   Date Value Ref Range Status   06/25/2020 NEG  Final     Cocaine Metabolite Screen, Urine   Date Value Ref Range Status   06/25/2020 NEG  Final     Gabapentin Screen, Urine   Date Value Ref Range Status   06/25/2020 NEG  Final     MDMA, Urine   Date Value Ref Range Status   06/25/2020 NEG  Final     Methamphetamine, Urine   Date Value Ref Range Status   06/25/2020 NEG  Final     Opiate Scrn, Ur   Date Value Ref Range Status   06/25/2020 NEG  Final     Oxycodone Screen, Ur   Date Value Ref Range Status   06/25/2020 NEG  Final     PCP Screen, Urine   Date Value Ref Range Status   06/25/2020 NEG  Final     Propoxyphene Screen, Urine   Date Value Ref Range Status   06/25/2020 NEG  Final     THC Screen, Urine   Date Value Ref Range Status   06/25/2020 NEG  Final     Tricyclic Antidepressants, Urine   Date Value Ref Range Status   06/25/2020 NEG  Final       Last Jeny Salazar: 01/28/2021  Medication Contract: 06/25/2020    Requested Prescriptions     Pending Prescriptions Disp Refills    Lisdexamfetamine Dimesylate (VYVANSE) 40 MG CAPS 30 capsule 0     Sig: Take 40 mg by mouth daily for 30 days. Please approve or refuse this medication.    [facility]

## 2021-01-29 RX ORDER — LISDEXAMFETAMINE DIMESYLATE 40 MG/1
40 CAPSULE ORAL DAILY
Qty: 30 CAPSULE | Refills: 0 | Status: SHIPPED | OUTPATIENT
Start: 2021-01-29 | End: 2021-03-01 | Stop reason: SDUPTHER

## 2021-03-01 DIAGNOSIS — F98.8 ATTENTION DEFICIT DISORDER (ADD) WITHOUT HYPERACTIVITY: ICD-10-CM

## 2021-03-01 RX ORDER — LISDEXAMFETAMINE DIMESYLATE 40 MG/1
40 CAPSULE ORAL DAILY
Qty: 30 CAPSULE | Refills: 0 | Status: SHIPPED | OUTPATIENT
Start: 2021-03-01 | End: 2021-04-12 | Stop reason: SDUPTHER

## 2021-03-01 RX ORDER — LEVONORGESTREL AND ETHINYL ESTRADIOL 0.1-0.02MG
1 KIT ORAL DAILY
Qty: 3 PACKET | Refills: 3 | Status: SHIPPED | OUTPATIENT
Start: 2021-03-01 | End: 2021-08-31 | Stop reason: SDUPTHER

## 2021-04-12 ENCOUNTER — TELEMEDICINE (OUTPATIENT)
Dept: INTERNAL MEDICINE | Age: 21
End: 2021-04-12
Payer: COMMERCIAL

## 2021-04-12 DIAGNOSIS — F98.8 ATTENTION DEFICIT DISORDER (ADD) WITHOUT HYPERACTIVITY: ICD-10-CM

## 2021-04-12 PROCEDURE — 99213 OFFICE O/P EST LOW 20 MIN: CPT | Performed by: INTERNAL MEDICINE

## 2021-04-12 RX ORDER — LISDEXAMFETAMINE DIMESYLATE 40 MG/1
40 CAPSULE ORAL DAILY
Qty: 30 CAPSULE | Refills: 0 | Status: SHIPPED | OUTPATIENT
Start: 2021-04-12 | End: 2021-05-18 | Stop reason: SDUPTHER

## 2021-04-12 SDOH — ECONOMIC STABILITY: INCOME INSECURITY: HOW HARD IS IT FOR YOU TO PAY FOR THE VERY BASICS LIKE FOOD, HOUSING, MEDICAL CARE, AND HEATING?: NOT HARD AT ALL

## 2021-04-12 SDOH — ECONOMIC STABILITY: TRANSPORTATION INSECURITY
IN THE PAST 12 MONTHS, HAS LACK OF TRANSPORTATION KEPT YOU FROM MEETINGS, WORK, OR FROM GETTING THINGS NEEDED FOR DAILY LIVING?: NO

## 2021-04-12 ASSESSMENT — ENCOUNTER SYMPTOMS
CHEST TIGHTNESS: 0
COUGH: 0
CONSTIPATION: 0
SORE THROAT: 0
ABDOMINAL PAIN: 0
WHEEZING: 0

## 2021-04-12 ASSESSMENT — PATIENT HEALTH QUESTIONNAIRE - PHQ9
1. LITTLE INTEREST OR PLEASURE IN DOING THINGS: 0
SUM OF ALL RESPONSES TO PHQ QUESTIONS 1-9: 0

## 2021-04-12 NOTE — PROGRESS NOTES
Chief Complaint   Patient presents with    3 Month Follow-Up     History of presenting illness:  Justin Fry is a22 y.o. female     TELEHEALTH EVALUATION -- Audio/Visual (During QUWGC-77 public health emergency)  Patient location-home  Pursuant to the emergency declaration under the [place] [place] [address] [facility] authority and the [employer] Act, this Virtual  Visit was conducted, with patient's consent, to reduce the patient's risk of exposure to COVID-19 and provide continuity of care for an established patient. Services were provided through a video synchronous discussion virtually to substitute for in-person clinic visit. Reason for VV:    FU ADD  Doing well on current rx  She is currently 3-year at [employer] program  Has been doing well on her tests/progressing well  adderall helps with her studies/ attention    Patient Active Problem List    Diagnosis Date Noted    Attention deficit disorder (ADD) without hyperactivity 08/09/2018    Need for vaccination 08/09/2018     No past medical history on file. Past Surgical History:   Procedure Laterality Date    ARM SURGERY      FEMUR FRACTURE SURGERY  2014    fell from horse     Current Outpatient Medications   Medication Sig Dispense Refill    Lisdexamfetamine Dimesylate (VYVANSE) 40 MG CAPS Take 40 mg by mouth daily for 30 days. 30 capsule 0    levonorgestrel-ethinyl estradiol (LESSINA) 0.1-20 MG-MCG per tablet Take 1 tablet by mouth daily 3 packet 3     No current facility-administered medications for this visit. No Known Allergies  Social History     Tobacco Use    Smoking status: Never Smoker    Smokeless tobacco: Never Used   Substance Use Topics    Alcohol use: Yes      No family history on file. Review of Systems   Constitutional: Negative for chills, fatigue and fever.    HENT: Negative for congestion, ear pain, nosebleeds, postnasal drip and sore throat. Respiratory: Negative for cough, chest tightness and wheezing. Cardiovascular: Negative for chest pain, palpitations and leg swelling. Gastrointestinal: Negative for abdominal pain and constipation. Genitourinary: Negative for dysuria and urgency. Musculoskeletal: Negative. Negative for arthralgias. Skin: Negative for rash. Neurological: Negative for dizziness and headaches. Psychiatric/Behavioral: Negative. There were no vitals filed for this visit. There is no height or weight on file to calculate BMI. Patient-Reported Vitals 4/12/2021   Patient-Reported Weight 147   Patient-Reported Height 5 3   Patient-Reported Temperature -        Physical Exam  PHYSICAL EXAMINATION:  [ INSTRUCTIONS:  \"[x]\" Indicates a positive item  \"[]\" Indicates a negative item  -- DELETE ALL ITEMS NOT EXAMINED]  [x] Alert  [x] Oriented to person/place/time    [x] No apparent distress  [] Toxic appearing    [] Face flushed appearing [] Sclera clear  [] Lips are cyanotic      [x] Breathing appears normal  [] Appears tachypneic      [] Rash on visible skin    [x] Cranial Nerves II-XII grossly intact    [x] Motor grossly intact in visible upper extremities    [x] Motor grossly intact in visible lower extremities    [x] Normal Mood  [] Anxious appearing    [] Depressed appearing  [] Confused appearing      [] Poor short term memory  [] Poor long term memory    [] OTHER:      Due to this being a TeleHealth encounter, evaluation of the following organ systems is limited: Vitals/Constitutional/EENT/Resp/CV/GI//MS/Neuro/Skin/Heme-Lymph-Imm. Lab Review   No visits with results within 2 Month(s) from this visit.    Latest known visit with results is:   Office Visit on 06/25/2020   Component Date Value    Amphetamine Screen, Urine 06/25/2020 POS     Barbiturate Screen, Urine 06/25/2020 NEG     Benzodiazepine Screen, U* 06/25/2020 NEG     Buprenorphine Urine 06/25/2020 NEG     Cocaine Metabolite Scree* 06/25/2020 NEG     Gabapentin Screen, Urine 06/25/2020 NEG     MDMA, Urine 06/25/2020 NEG     Methamphetamine, Urine 06/25/2020 NEG     Methadone Screen, Urine 06/25/2020 NEG     Opiate Scrn, Ur 06/25/2020 NEG     Oxycodone Screen, Ur 06/25/2020 NEG     PCP Screen, Urine 06/25/2020 NEG     Propoxyphene Screen, Uri* 06/25/2020 NEG     THC Screen, Urine 53/04/5410 NEG     Tricyclic Antidepressant* 77/63/2907 NEG            ASSESSMENT/PLAN:    Attention deficit disorder (ADD) without hyperactivity   FU ADD  Currently under 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 Feels some days Vyvanse not working as well  She is currently 3-year at [address] helps with her studies/ attention    Ivan Glassfisher 4/2021  Med Contract 06/25/2020  UDS 06/25/2020   RX Vyvanse 40 mg daily    -     Lisdexamfetamine Dimesylate (VYVANSE) 40 MG CAPS; Take 40 mg by mouth daily for 30 days. I also discussed with her during the summertime trying to skip days since she is currently on vacation/do not suggest taking this medication while on vacation. We will need to see her back at office in about 3 to 4 months              No orders of the defined types were placed in this encounter. New Prescriptions    No medications on file         No follow-ups on file. There are no Patient Instructions on file for this visit. EMR Dragon/transcription disclaimer:Significant part of this  encounter note is electronic transcription/translationof spoken language to printed text. The electronic translation of spoken language may be erroneous, or at times, nonsensical words or phrases may be inadvertently transcribed.  Although I have reviewed the note for sucherrors, some may still exist.

## 2021-05-18 DIAGNOSIS — F98.8 ATTENTION DEFICIT DISORDER (ADD) WITHOUT HYPERACTIVITY: ICD-10-CM

## 2021-05-18 RX ORDER — LISDEXAMFETAMINE DIMESYLATE 40 MG/1
40 CAPSULE ORAL DAILY
Qty: 30 CAPSULE | Refills: 0 | Status: SHIPPED | OUTPATIENT
Start: 2021-05-18 | End: 2021-06-18 | Stop reason: SDUPTHER

## 2021-05-18 NOTE — TELEPHONE ENCOUNTER
lCark Haywood called to request a refill on her medication. Last office visit : 4/12/2021   Next office visit : Visit date not found     Last UDS:   Amphetamine Screen, Urine   Date Value Ref Range Status   06/25/2020 POS  Final     Barbiturate Screen, Urine   Date Value Ref Range Status   06/25/2020 NEG  Final     Benzodiazepine Screen, Urine   Date Value Ref Range Status   06/25/2020 NEG  Final     Buprenorphine Urine   Date Value Ref Range Status   06/25/2020 NEG  Final     Cocaine Metabolite Screen, Urine   Date Value Ref Range Status   06/25/2020 NEG  Final     Gabapentin Screen, Urine   Date Value Ref Range Status   06/25/2020 NEG  Final     MDMA, Urine   Date Value Ref Range Status   06/25/2020 NEG  Final     Methamphetamine, Urine   Date Value Ref Range Status   06/25/2020 NEG  Final     Opiate Scrn, Ur   Date Value Ref Range Status   06/25/2020 NEG  Final     Oxycodone Screen, Ur   Date Value Ref Range Status   06/25/2020 NEG  Final     PCP Screen, Urine   Date Value Ref Range Status   06/25/2020 NEG  Final     Propoxyphene Screen, Urine   Date Value Ref Range Status   06/25/2020 NEG  Final     THC Screen, Urine   Date Value Ref Range Status   06/25/2020 NEG  Final     Tricyclic Antidepressants, Urine   Date Value Ref Range Status   06/25/2020 NEG  Final       Last Vernelle Boards: 04/12/2021  Medication Contract: 06/25/2020    Requested Prescriptions     Pending Prescriptions Disp Refills    Lisdexamfetamine Dimesylate (VYVANSE) 40 MG CAPS 30 capsule 0     Sig: Take 40 mg by mouth daily for 30 days. Please approve or refuse this medication.    Levar Daniels

## 2021-06-18 DIAGNOSIS — F98.8 ATTENTION DEFICIT DISORDER (ADD) WITHOUT HYPERACTIVITY: ICD-10-CM

## 2021-06-18 RX ORDER — LISDEXAMFETAMINE DIMESYLATE 40 MG/1
40 CAPSULE ORAL DAILY
Qty: 30 CAPSULE | Refills: 0 | Status: SHIPPED | OUTPATIENT
Start: 2021-06-18 | End: 2021-07-26 | Stop reason: SDUPTHER

## 2021-06-18 NOTE — TELEPHONE ENCOUNTER
Eileen Barrow called to request a refill on her medication. Last office visit : 4/12/2021   Next office visit : Visit date not found     Last UDS:   Amphetamine Screen, Urine   Date Value Ref Range Status   06/25/2020 POS  Final     Barbiturate Screen, Urine   Date Value Ref Range Status   06/25/2020 NEG  Final     Benzodiazepine Screen, Urine   Date Value Ref Range Status   06/25/2020 NEG  Final     Buprenorphine Urine   Date Value Ref Range Status   06/25/2020 NEG  Final     Cocaine Metabolite Screen, Urine   Date Value Ref Range Status   06/25/2020 NEG  Final     Gabapentin Screen, Urine   Date Value Ref Range Status   06/25/2020 NEG  Final     MDMA, Urine   Date Value Ref Range Status   06/25/2020 NEG  Final     Methamphetamine, Urine   Date Value Ref Range Status   06/25/2020 NEG  Final     Opiate Scrn, Ur   Date Value Ref Range Status   06/25/2020 NEG  Final     Oxycodone Screen, Ur   Date Value Ref Range Status   06/25/2020 NEG  Final     PCP Screen, Urine   Date Value Ref Range Status   06/25/2020 NEG  Final     Propoxyphene Screen, Urine   Date Value Ref Range Status   06/25/2020 NEG  Final     THC Screen, Urine   Date Value Ref Range Status   06/25/2020 NEG  Final     Tricyclic Antidepressants, Urine   Date Value Ref Range Status   06/25/2020 NEG  Final       [facility]: 04/12/2021  Medication Contract: 06/25/2020    Requested Prescriptions     Pending Prescriptions Disp Refills    Lisdexamfetamine Dimesylate (VYVANSE) 40 MG CAPS 30 capsule 0     Sig: Take 40 mg by mouth daily for 30 days. Please approve or refuse this medication.    Annette Grapes

## 2021-07-19 DIAGNOSIS — F98.8 ATTENTION DEFICIT DISORDER (ADD) WITHOUT HYPERACTIVITY: ICD-10-CM

## 2021-07-19 RX ORDER — LISDEXAMFETAMINE DIMESYLATE 40 MG/1
40 CAPSULE ORAL DAILY
Qty: 30 CAPSULE | Refills: 0 | OUTPATIENT
Start: 2021-07-19 | End: 2021-08-18

## 2021-07-26 ENCOUNTER — VIRTUAL VISIT (OUTPATIENT)
Dept: INTERNAL MEDICINE | Age: 21
End: 2021-07-26
Payer: COMMERCIAL

## 2021-07-26 DIAGNOSIS — F98.8 ATTENTION DEFICIT DISORDER (ADD) WITHOUT HYPERACTIVITY: Primary | ICD-10-CM

## 2021-07-26 DIAGNOSIS — B00.1 HERPES LABIALIS: ICD-10-CM

## 2021-07-26 PROCEDURE — 99213 OFFICE O/P EST LOW 20 MIN: CPT | Performed by: INTERNAL MEDICINE

## 2021-07-26 RX ORDER — LISDEXAMFETAMINE DIMESYLATE 40 MG/1
40 CAPSULE ORAL DAILY
Qty: 30 CAPSULE | Refills: 0 | Status: SHIPPED | OUTPATIENT
Start: 2021-07-26 | End: 2021-08-25 | Stop reason: SDUPTHER

## 2021-07-26 RX ORDER — VALACYCLOVIR HYDROCHLORIDE 1 G/1
1000 TABLET, FILM COATED ORAL 3 TIMES DAILY
Qty: 21 TABLET | Refills: 0 | Status: SHIPPED | OUTPATIENT
Start: 2021-07-26 | End: 2022-09-29 | Stop reason: SDUPTHER

## 2021-07-26 ASSESSMENT — ENCOUNTER SYMPTOMS
WHEEZING: 0
SORE THROAT: 0
ABDOMINAL PAIN: 0
COUGH: 0
CONSTIPATION: 0
CHEST TIGHTNESS: 0

## 2021-07-26 NOTE — PROGRESS NOTES
use: Yes      No family history on file. Review of Systems   Constitutional: Negative for chills, fatigue and fever. HENT: Negative for congestion, ear pain, nosebleeds, postnasal drip and sore throat. Respiratory: Negative for cough, chest tightness and wheezing. Cardiovascular: Negative for chest pain, palpitations and leg swelling. Gastrointestinal: Negative for abdominal pain and constipation. Genitourinary: Negative for dysuria and urgency. Musculoskeletal: Negative. Negative for arthralgias. Skin: Negative for rash. Neurological: Negative for dizziness and headaches. Psychiatric/Behavioral: Negative. There were no vitals filed for this visit. There is no height or weight on file to calculate BMI. Patient-Reported Vitals 7/26/2021   Patient-Reported Weight 145   Patient-Reported Height 5 2   Patient-Reported Temperature -        Physical Exam  PHYSICAL EXAMINATION:  [ INSTRUCTIONS:  \"[x]\" Indicates a positive item  \"[]\" Indicates a negative item  -- DELETE ALL ITEMS NOT EXAMINED]  [x] Alert  [x] Oriented to person/place/time    [x] No apparent distress  [] Toxic appearing    [] Face flushed appearing [] Sclera clear  [] Lips are cyanotic      [x] Breathing appears normal  [] Appears tachypneic      [] Rash on visible skin    [x] Cranial Nerves II-XII grossly intact    [x] Motor grossly intact in visible upper extremities    [x] Motor grossly intact in visible lower extremities    [x] Normal Mood  [] Anxious appearing    [] Depressed appearing  [] Confused appearing      [] Poor short term memory  [] Poor long term memory    [] OTHER:      Due to this being a TeleHealth encounter, evaluation of the following organ systems is limited: Vitals/Constitutional/EENT/Resp/CV/GI//MS/Neuro/Skin/Heme-Lymph-Imm. Lab Review   No visits with results within 2 Month(s) from this visit.    Latest known visit with results is:   Office Visit on 06/25/2020   Component Date Value    Amphetamine Screen, Urine 06/25/2020 POS     Barbiturate Screen, Urine 06/25/2020 NEG     Benzodiazepine Screen, U* 06/25/2020 NEG     Buprenorphine Urine 06/25/2020 NEG     Cocaine Metabolite Scree* 06/25/2020 NEG     Gabapentin Screen, Urine 06/25/2020 NEG     MDMA, Urine 06/25/2020 NEG     Methamphetamine, Urine 06/25/2020 NEG     Methadone Screen, Urine 06/25/2020 NEG     Opiate Scrn, Ur 06/25/2020 NEG     Oxycodone Screen, Ur 06/25/2020 NEG     PCP Screen, Urine 06/25/2020 NEG     Propoxyphene Screen, Uri* 06/25/2020 NEG     THC Screen, Urine 59/79/8916 NEG     Tricyclic Antidepressant* 02/88/3057 NEG            ASSESSMENT/PLAN:    Attention deficit disorder (ADD) without hyperactivity   FU ADD  Currently under 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 Feels some days Vyvanse not working as well  She is currently 3-year at [employer] helps with her studies/ attention     Fernanda Moran 7/2021  Med Contract 06/25/2020  UDS 06/25/2020   RX Vyvanse 40 mg daily     -     Lisdexamfetamine Dimesylate (VYVANSE) 40 MG CAPS; Take 40 mg by mouth daily for 30 days. I also discussed with her during the summertime trying to skip days since she is currently on vacation/do not suggest taking this medication while on vacation. We will need to see her back at office in about 3 to 4 months       Herpes labialis, occasional breakouts  Rx Valtrex 1 g p.o. every 12 hours x2 doses as needed    No orders of the defined types were placed in this encounter. New Prescriptions    No medications on file         No follow-ups on file. There are no Patient Instructions on file for this visit. EMR Dragon/transcription disclaimer:Significant part of this  encounter note is electronic transcription/translationof spoken language to printed text. The electronic translation of spoken language may be erroneous, or at times, nonsensical words or phrases may be inadvertently transcribed.  Although I have reviewed the note for sucherrors, some may still exist.

## 2021-08-25 ENCOUNTER — OFFICE VISIT (OUTPATIENT)
Dept: INTERNAL MEDICINE | Age: 21
End: 2021-08-25
Payer: COMMERCIAL

## 2021-08-25 VITALS
WEIGHT: 156 LBS | HEIGHT: 63 IN | DIASTOLIC BLOOD PRESSURE: 72 MMHG | HEART RATE: 72 BPM | SYSTOLIC BLOOD PRESSURE: 108 MMHG | BODY MASS INDEX: 27.64 KG/M2 | RESPIRATION RATE: 18 BRPM | OXYGEN SATURATION: 98 %

## 2021-08-25 DIAGNOSIS — Z11.59 NEED FOR HEPATITIS C SCREENING TEST: ICD-10-CM

## 2021-08-25 DIAGNOSIS — Z02.83 ENCOUNTER FOR DRUG SCREENING: ICD-10-CM

## 2021-08-25 DIAGNOSIS — Z00.00 ANNUAL PHYSICAL EXAM: Primary | ICD-10-CM

## 2021-08-25 DIAGNOSIS — Z11.4 SCREENING FOR HIV (HUMAN IMMUNODEFICIENCY VIRUS): ICD-10-CM

## 2021-08-25 DIAGNOSIS — F98.8 ATTENTION DEFICIT DISORDER (ADD) WITHOUT HYPERACTIVITY: ICD-10-CM

## 2021-08-25 DIAGNOSIS — Z12.4 ENCOUNTER FOR PAPANICOLAOU SMEAR FOR CERVICAL CANCER SCREENING: ICD-10-CM

## 2021-08-25 PROCEDURE — 99395 PREV VISIT EST AGE 18-39: CPT | Performed by: INTERNAL MEDICINE

## 2021-08-25 RX ORDER — LISDEXAMFETAMINE DIMESYLATE 40 MG/1
40 CAPSULE ORAL DAILY
Qty: 30 CAPSULE | Refills: 0 | Status: SHIPPED | OUTPATIENT
Start: 2021-08-25 | End: 2021-08-31 | Stop reason: SDUPTHER

## 2021-08-25 ASSESSMENT — ENCOUNTER SYMPTOMS
COUGH: 0
SORE THROAT: 0
DIARRHEA: 0
VOMITING: 0
SINUS PRESSURE: 0
CONSTIPATION: 0
TROUBLE SWALLOWING: 0
NAUSEA: 0
BLOOD IN STOOL: 0
ABDOMINAL PAIN: 0
COLOR CHANGE: 0
CHEST TIGHTNESS: 0
EYE PAIN: 0
EYE REDNESS: 0
VOICE CHANGE: 0
WHEEZING: 0

## 2021-08-25 NOTE — PROGRESS NOTES
Chief Complaint:   Udell Meigs is a 23 y.o. female who presents forcomplete physical exam.    History of Present Illness:      Udell Meigs is a 23 y.o. female who presents todayfor wellness visit AND follow up on her chronic medical conditions as noted below. Patient Active Problem List    Diagnosis Date Noted    Attention deficit disorder (ADD) without hyperactivity 08/09/2018    Need for vaccination 08/09/2018       No past medical history on file. Past Surgical History:   Procedure Laterality Date    ARM SURGERY      FEMUR FRACTURE SURGERY  2014    fell from horse       Current Outpatient Medications   Medication Sig Dispense Refill    Lisdexamfetamine Dimesylate (VYVANSE) 40 MG CAPS Take 40 mg by mouth daily for 30 days. 30 capsule 0    levonorgestrel-ethinyl estradiol (LESSINA) 0.1-20 MG-MCG per tablet Take 1 tablet by mouth daily 3 packet 3     No current facility-administered medications for this visit. No Known Allergies    Social History     Socioeconomic History    Marital status: Single     Spouse name: None    Number of children: None    Years of education: None    Highest education level: None   Occupational History    None   Tobacco Use    Smoking status: Never Smoker    Smokeless tobacco: Never Used   Substance and Sexual Activity    Alcohol use: Yes    Drug use: No    Sexual activity: Never   Other Topics Concern    None   Social History Narrative    None     Social Determinants of Health     Financial Resource Strain: Low Risk     Difficulty of Paying Living Expenses: Not hard at all   Food Insecurity: No Food Insecurity    Worried About Running Out of Food in the Last Year: Never true    Nissa of Food in the Last Year: Never true   Transportation Needs: No Transportation Needs    Lack of Transportation (Medical): No    Lack of Transportation (Non-Medical):  No   Physical Activity:     Days of Exercise per Week:     Minutes of Exercise per Session: Stress:     Feeling of Stress :    Social Connections:     Frequency of Communication with Friends and Family:     Frequency of Social Gatherings with Friends and Family:     Attends Orthodox Services:     Active Member of Clubs or Organizations:     Attends Club or Organization Meetings:     Marital Status:    Intimate Partner Violence:     Fear of Current or Ex-Partner:     Emotionally Abused:     Physically Abused:     Sexually Abused:      No family history on file. Past Surgical History:   Procedure Laterality Date    ARM SURGERY      FEMUR FRACTURE SURGERY  2014    fell from horse         Lab Review   No visits with results within 2 Month(s) from this visit. Latest known visit with results is:   Office Visit on 06/25/2020   Component Date Value    Amphetamine Screen, Urine 06/25/2020 POS     Barbiturate Screen, Urine 06/25/2020 NEG     Benzodiazepine Screen, U* 06/25/2020 NEG     Buprenorphine Urine 06/25/2020 NEG     Cocaine Metabolite Scree* 06/25/2020 NEG     Gabapentin Screen, Urine 06/25/2020 NEG     MDMA, Urine 06/25/2020 NEG     Methamphetamine, Urine 06/25/2020 NEG     Methadone Screen, Urine 06/25/2020 NEG     Opiate Scrn, Ur 06/25/2020 NEG     Oxycodone Screen, Ur 06/25/2020 NEG     PCP Screen, Urine 06/25/2020 NEG     Propoxyphene Screen, Uri* 06/25/2020 NEG     THC Screen, Urine 19/18/3047 NEG     Tricyclic Antidepressant* 58/03/2330 NEG          Review of Systems   Constitutional: Negative for chills, fatigue and fever. HENT: Negative for congestion, ear pain, postnasal drip, sinus pressure, sore throat, trouble swallowing and voice change. Eyes: Negative for pain, redness and visual disturbance. Respiratory: Negative for cough, chest tightness and wheezing. Cardiovascular: Negative for chest pain, palpitations and leg swelling. Gastrointestinal: Negative for abdominal pain, blood in stool, constipation, diarrhea, nausea and vomiting.    Endocrine: disclaimer:Significant part of this  encounter note is electronic transcription/translation of spoken language to printed text. The electronic translation of spoken language may beerroneous, or at times, nonsensical words or phrases may be inadvertently transcribed.  Although I have reviewed the note for such errors, some may still exist.

## 2021-08-31 ENCOUNTER — PATIENT MESSAGE (OUTPATIENT)
Dept: INTERNAL MEDICINE | Age: 21
End: 2021-08-31

## 2021-08-31 DIAGNOSIS — F98.8 ATTENTION DEFICIT DISORDER (ADD) WITHOUT HYPERACTIVITY: ICD-10-CM

## 2021-08-31 DIAGNOSIS — Z02.83 ENCOUNTER FOR DRUG SCREENING: Primary | ICD-10-CM

## 2021-08-31 PROCEDURE — 80305 DRUG TEST PRSMV DIR OPT OBS: CPT | Performed by: INTERNAL MEDICINE

## 2021-08-31 RX ORDER — LISDEXAMFETAMINE DIMESYLATE 40 MG/1
40 CAPSULE ORAL DAILY
Qty: 90 CAPSULE | Refills: 0 | Status: SHIPPED | OUTPATIENT
Start: 2021-08-31 | End: 2022-01-03 | Stop reason: SDUPTHER

## 2021-08-31 RX ORDER — LEVONORGESTREL AND ETHINYL ESTRADIOL 0.1-0.02MG
1 KIT ORAL DAILY
Qty: 3 PACKET | Refills: 3 | Status: SHIPPED | OUTPATIENT
Start: 2021-08-31 | End: 2022-01-03 | Stop reason: SDUPTHER

## 2021-08-31 NOTE — TELEPHONE ENCOUNTER
Marija Etienne called requesting a refill of the below medication which has been pended for you:     Requested Prescriptions     Pending Prescriptions Disp Refills    levonorgestrel-ethinyl estradiol (LESSINA) 0.1-20 MG-MCG per tablet 3 packet 3     Sig: Take 1 tablet by mouth daily       Last Appointment Date: 8/25/2021  Next Appointment Date: Visit date not found    No Known Allergies

## 2021-08-31 NOTE — TELEPHONE ENCOUNTER
Suzette Scoutcale called to request a refill on her medication. Last office visit : 8/25/2021   Next office visit : Visit date not found     Last UDS:   Amphetamine Screen, Urine   Date Value Ref Range Status   08/31/2021 POS  Final     Barbiturate Screen, Urine   Date Value Ref Range Status   08/31/2021 NEG  Final     Benzodiazepine Screen, Urine   Date Value Ref Range Status   08/31/2021 NEG  Final     Buprenorphine Urine   Date Value Ref Range Status   08/31/2021 NEG  Final     Cocaine Metabolite Screen, Urine   Date Value Ref Range Status   08/31/2021 NA  Final     Gabapentin Screen, Urine   Date Value Ref Range Status   08/31/2021 NA  Final     MDMA, Urine   Date Value Ref Range Status   08/31/2021 NEG  Final     Methamphetamine, Urine   Date Value Ref Range Status   08/31/2021 NEG  Final     Opiate Scrn, Ur   Date Value Ref Range Status   08/31/2021 NEG  Final     Oxycodone Screen, Ur   Date Value Ref Range Status   08/31/2021 NEG  Final     PCP Screen, Urine   Date Value Ref Range Status   08/31/2021 NEG  Final     Propoxyphene Screen, Urine   Date Value Ref Range Status   08/31/2021 NWG  Final     THC Screen, Urine   Date Value Ref Range Status   08/31/2021 NEG  Final     Tricyclic Antidepressants, Urine   Date Value Ref Range Status   08/31/2021 NEG  Final       Last Yohana Olguin: 08/25/2021  Medication Contract: 08/25/201    Requested Prescriptions     Pending Prescriptions Disp Refills    Lisdexamfetamine Dimesylate (VYVANSE) 40 MG CAPS 90 capsule 0     Sig: Take 40 mg by mouth daily for 30 days. Please approve or refuse this medication.    Angelena Lombard

## 2021-08-31 NOTE — TELEPHONE ENCOUNTER
From: Sabine Butler  To: Joshua Shields MD  Sent: 8/31/2021 12:25 PM CDT  Subject: Prescription Question    Krystal Payer pharmacist said it was okay for me to get 90 days of Vyvanse for studying abroad.

## 2022-01-03 ENCOUNTER — HOSPITAL ENCOUNTER (OUTPATIENT)
Dept: GENERAL RADIOLOGY | Age: 22
Discharge: HOME | End: 2022-01-03
Payer: COMMERCIAL

## 2022-01-03 ENCOUNTER — OFFICE VISIT (OUTPATIENT)
Dept: INTERNAL MEDICINE | Age: 22
End: 2022-01-03
Payer: COMMERCIAL

## 2022-01-03 VITALS
WEIGHT: 149 LBS | DIASTOLIC BLOOD PRESSURE: 80 MMHG | SYSTOLIC BLOOD PRESSURE: 100 MMHG | HEIGHT: 62 IN | BODY MASS INDEX: 27.42 KG/M2 | RESPIRATION RATE: 18 BRPM | HEART RATE: 98 BPM | OXYGEN SATURATION: 99 %

## 2022-01-03 DIAGNOSIS — R52 BODY ACHES: ICD-10-CM

## 2022-01-03 DIAGNOSIS — M25.469 KNEE SWELLING: ICD-10-CM

## 2022-01-03 DIAGNOSIS — M79.10 MUSCLE ACHE: ICD-10-CM

## 2022-01-03 DIAGNOSIS — F98.8 ATTENTION DEFICIT DISORDER (ADD) WITHOUT HYPERACTIVITY: ICD-10-CM

## 2022-01-03 DIAGNOSIS — F98.8 ATTENTION DEFICIT DISORDER (ADD) WITHOUT HYPERACTIVITY: Primary | ICD-10-CM

## 2022-01-03 DIAGNOSIS — Z30.41 FAMILY PLANNING, BCP MAINTENANCE: ICD-10-CM

## 2022-01-03 DIAGNOSIS — R45.0 JITTERY: ICD-10-CM

## 2022-01-03 LAB
ALBUMIN SERPL-MCNC: 3.2 G/DL (ref 3.5–5.2)
ALP BLD-CCNC: 114 U/L (ref 35–104)
ALT SERPL-CCNC: 26 U/L (ref 5–33)
ANION GAP SERPL CALCULATED.3IONS-SCNC: 12 MMOL/L (ref 7–19)
AST SERPL-CCNC: 17 U/L (ref 5–32)
BASOPHILS ABSOLUTE: 0.1 K/UL (ref 0–0.2)
BASOPHILS RELATIVE PERCENT: 0.4 % (ref 0–1)
BILIRUB SERPL-MCNC: <0.2 MG/DL (ref 0.2–1.2)
BUN BLDV-MCNC: 12 MG/DL (ref 6–20)
C-REACTIVE PROTEIN: 16.76 MG/DL (ref 0–0.5)
CALCIUM SERPL-MCNC: 9.1 MG/DL (ref 8.6–10)
CHLORIDE BLD-SCNC: 100 MMOL/L (ref 98–111)
CO2: 28 MMOL/L (ref 22–29)
CREAT SERPL-MCNC: 0.6 MG/DL (ref 0.5–0.9)
EOSINOPHILS ABSOLUTE: 0.2 K/UL (ref 0–0.6)
EOSINOPHILS RELATIVE PERCENT: 1.5 % (ref 0–5)
GFR AFRICAN AMERICAN: >59
GFR NON-AFRICAN AMERICAN: >60
GLUCOSE BLD-MCNC: 80 MG/DL (ref 74–109)
HCT VFR BLD CALC: 36.2 % (ref 37–47)
HEMOGLOBIN: 10.3 G/DL (ref 12–16)
HYPOCHROMIA: ABNORMAL
IMMATURE GRANULOCYTES #: 0.1 K/UL
LYMPHOCYTES ABSOLUTE: 1.9 K/UL (ref 1.1–4.5)
LYMPHOCYTES RELATIVE PERCENT: 14.1 % (ref 20–40)
MCH RBC QN AUTO: 24.2 PG (ref 27–31)
MCHC RBC AUTO-ENTMCNC: 28.5 G/DL (ref 33–37)
MCV RBC AUTO: 85.2 FL (ref 81–99)
MONOCYTES ABSOLUTE: 1.3 K/UL (ref 0–0.9)
MONOCYTES RELATIVE PERCENT: 9.7 % (ref 0–10)
NEUTROPHILS ABSOLUTE: 9.8 K/UL (ref 1.5–7.5)
NEUTROPHILS RELATIVE PERCENT: 73.8 % (ref 50–65)
PDW BLD-RTO: 13.3 % (ref 11.5–14.5)
PLATELET # BLD: 789 K/UL (ref 130–400)
PLATELET SLIDE REVIEW: ABNORMAL
PMV BLD AUTO: 8.3 FL (ref 9.4–12.3)
POTASSIUM SERPL-SCNC: 4.4 MMOL/L (ref 3.5–5)
RBC # BLD: 4.25 M/UL (ref 4.2–5.4)
SEDIMENTATION RATE, ERYTHROCYTE: 102 MM/HR (ref 0–20)
SODIUM BLD-SCNC: 140 MMOL/L (ref 136–145)
TOTAL PROTEIN: 7.7 G/DL (ref 6.6–8.7)
TSH SERPL DL<=0.05 MIU/L-ACNC: 0.95 UIU/ML (ref 0.27–4.2)
WBC # BLD: 13.2 K/UL (ref 4.8–10.8)

## 2022-01-03 PROCEDURE — 99214 OFFICE O/P EST MOD 30 MIN: CPT | Performed by: INTERNAL MEDICINE

## 2022-01-03 PROCEDURE — 73562 X-RAY EXAM OF KNEE 3: CPT

## 2022-01-03 RX ORDER — LISDEXAMFETAMINE DIMESYLATE 40 MG/1
40 CAPSULE ORAL DAILY
Qty: 90 CAPSULE | Refills: 0 | Status: SHIPPED
Start: 2022-01-03 | End: 2022-01-03 | Stop reason: ALTCHOICE

## 2022-01-03 RX ORDER — LEVONORGESTREL AND ETHINYL ESTRADIOL 0.1-0.02MG
1 KIT ORAL DAILY
Qty: 3 PACKET | Refills: 3 | Status: SHIPPED | OUTPATIENT
Start: 2022-01-03 | End: 2022-07-19 | Stop reason: SDUPTHER

## 2022-01-03 ASSESSMENT — PATIENT HEALTH QUESTIONNAIRE - PHQ9
SUM OF ALL RESPONSES TO PHQ QUESTIONS 1-9: 0
SUM OF ALL RESPONSES TO PHQ9 QUESTIONS 1 & 2: 0
1. LITTLE INTEREST OR PLEASURE IN DOING THINGS: 0
2. FEELING DOWN, DEPRESSED OR HOPELESS: 0

## 2022-01-03 ASSESSMENT — ENCOUNTER SYMPTOMS
CHEST TIGHTNESS: 0
CONSTIPATION: 0
WHEEZING: 0
SORE THROAT: 0
ABDOMINAL PAIN: 0
COUGH: 0

## 2022-01-03 NOTE — PROGRESS NOTES
Chief Complaint   Patient presents with    3 Month Follow-Up    Knee Pain     Pt states that since October she has had some knee swelling mainly right side and joint pain. also in the ankles as well     History of presenting illness:  Yohana Moore is a17 y.o. female who presents today for follow up on her chronic medical conditions as noted below. Patient Active Problem List    Diagnosis Date Noted    Attention deficit disorder (ADD) without hyperactivity 08/09/2018    Need for vaccination 08/09/2018     No past medical history on file. Past Surgical History:   Procedure Laterality Date    ARM SURGERY      FEMUR FRACTURE SURGERY  2014    fell from horse     Current Outpatient Medications   Medication Sig Dispense Refill    levonorgestrel-ethinyl estradiol (LESSINA) 0.1-20 MG-MCG per tablet Take 1 tablet by mouth daily 3 packet 3    Lisdexamfetamine Dimesylate (VYVANSE) 40 MG CAPS Take 40 mg by mouth daily for 30 days. 90 capsule 0    lisdexamfetamine (VYVANSE) 30 MG capsule Take 1 capsule by mouth every morning for 90 days. 90 capsule 0     No current facility-administered medications for this visit. No Known Allergies  Social History     Tobacco Use    Smoking status: Never Smoker    Smokeless tobacco: Never Used   Substance Use Topics    Alcohol use: Yes      No family history on file. Review of Systems   Constitutional: Positive for fatigue. Negative for chills and fever. HENT: Negative for congestion, ear pain, nosebleeds, postnasal drip and sore throat. Respiratory: Negative for cough, chest tightness and wheezing. Cardiovascular: Negative for chest pain, palpitations and leg swelling. Gastrointestinal: Negative for abdominal pain and constipation. Genitourinary: Negative for dysuria and urgency. Musculoskeletal: Positive for arthralgias and gait problem. Rt knee pain     Skin: Negative for rash. Neurological: Negative for dizziness and headaches. Psychiatric/Behavioral: Negative. Vitals:    01/03/22 1420   BP: 100/80   Site: Left Upper Arm   Position: Sitting   Cuff Size: Large Adult   Pulse: 98   Resp: 18   SpO2: 99%   Weight: 149 lb (67.6 kg)   Height: 5' 2\" (1.575 m)     Body mass index is 27.25 kg/m². Physical Exam  Constitutional:       Appearance: She is well-developed. HENT:      Right Ear: External ear normal.      Left Ear: External ear normal.      Mouth/Throat:      Pharynx: No oropharyngeal exudate. Eyes:      Conjunctiva/sclera: Conjunctivae normal.      Pupils: Pupils are equal, round, and reactive to light. Neck:      Thyroid: No thyromegaly. Vascular: No JVD. Cardiovascular:      Rate and Rhythm: Normal rate. Heart sounds: Normal heart sounds. No murmur heard. Pulmonary:      Effort: No respiratory distress. Breath sounds: Normal breath sounds. No wheezing or rales. Chest:      Chest wall: No tenderness. Abdominal:      General: Bowel sounds are normal.      Palpations: Abdomen is soft. Musculoskeletal:      Cervical back: Neck supple. Lymphadenopathy:      Cervical: No cervical adenopathy. Skin:     Findings: No rash. Lab Review   No visits with results within 2 Month(s) from this visit.    Latest known visit with results is:   Orders Only on 08/31/2021   Component Date Value    Alcohol, Urine 08/31/2021 NEG     Amphetamine Screen, Urine 08/31/2021 POS     Barbiturate Screen, Urine 08/31/2021 NEG     Benzodiazepine Screen, U* 08/31/2021 NEG     Buprenorphine Urine 08/31/2021 NEG     Cocaine Metabolite Scree* 08/31/2021 NA     FENTANYL SCREEN, URINE 08/31/2021 NA     Gabapentin Screen, Urine 08/31/2021 NA     MDMA, Urine 08/31/2021 NEG     Methadone Screen, Urine 08/31/2021 NEG     Methamphetamine, Urine 08/31/2021 NEG     Opiate Scrn, Ur 08/31/2021 NEG     Oxycodone Screen, Ur 08/31/2021 NEG     PCP Screen, Urine 08/31/2021 NEG     Propoxyphene Screen, Uri* 08/31/2021 NWG  Synthetic Cannabinoids (* 08/31/2021 NA     THC Screen, Urine 08/31/2021 NEG     Tramadol Scrn, Ur 46/95/9972 NEG     Tricyclic Antidepressant* 73/23/7978 NEG            ASSESSMENT/PLAN:    Attention deficit disorder (ADD) without hyperactivity  Decrease dose:    Dc Vyvanse 40  Start:    -     lisdexamfetamine (VYVANSE) 30 MG capsule; Take 1 capsule by mouth every morning for 90 days. Knee swelling    Patient has had previous trauma when she fell from horse 5 years ago and sustained femoral fracture  Now the same knee that was injured at that time has been swelling more and hurting with walking  Obtain knee x-ray    -     XR KNEE RIGHT (3 VIEWS); Future    Proceed with Ortho referral  --     Jewel Goldman MD, Orthopaedic Surgery, [place]    Body aches    Jittery    Muscle ache    States that ever since she was sick in September has not been feeling herself  She currently is going to school in [facility] been living in [place] since August 2021  She feels more tired, has body aches. As above, her right knee has been hurting her  Generally achy/at times jittery  According to the mother she feels that her daughter is more fatigued compared to normal    Proceed with additional evaluation  Obtain  -     Comprehensive Metabolic Panel; Future  -     CBC Auto Differential; Future  -     TSH without Reflex; Future  -     Sedimentation Rate; Future  -     C-Reactive Protein; Future    Other orders  -     levonorgestrel-ethinyl estradiol (LESSINA) 0.1-20 MG-MCG per tablet; Take 1 tablet by mouth daily      Orders Placed This Encounter   Procedures    XR KNEE RIGHT (3 VIEWS)    Comprehensive Metabolic Panel    CBC Auto Differential    TSH without Reflex    Sedimentation Rate    C-Reactive Protein    Laila Eaton MD, Orthopaedic Surgery, [place]     New Prescriptions    LISDEXAMFETAMINE (VYVANSE) 30 MG CAPSULE    Take 1 capsule by mouth every morning for 90 days. No follow-ups on file.    There are no Patient Instructions on file for this visit. EMR Dragon/transcription disclaimer:Significant part of this  encounter note is electronic transcription/translationof spoken language to printed text. The electronic translation of spoken language may be erroneous, or at times, nonsensical words or phrases may be inadvertently transcribed.  Although I have reviewed the note for sucherrors, some may still exist.

## 2022-01-04 ENCOUNTER — HOSPITAL ENCOUNTER (OUTPATIENT)
Dept: GENERAL RADIOLOGY | Age: 22
Discharge: HOME | End: 2022-01-04
Payer: COMMERCIAL

## 2022-01-04 ENCOUNTER — TELEPHONE (OUTPATIENT)
Dept: INTERNAL MEDICINE | Age: 22
End: 2022-01-04

## 2022-01-04 DIAGNOSIS — R52 BODY ACHES: Primary | ICD-10-CM

## 2022-01-04 DIAGNOSIS — M25.40 JOINT SWELLING: ICD-10-CM

## 2022-01-04 DIAGNOSIS — R53.83 OTHER FATIGUE: ICD-10-CM

## 2022-01-04 DIAGNOSIS — M79.10 MUSCLE ACHE: ICD-10-CM

## 2022-01-04 DIAGNOSIS — R70.0 ELEVATED SED RATE: ICD-10-CM

## 2022-01-04 DIAGNOSIS — D64.9 ANEMIA, UNSPECIFIED TYPE: ICD-10-CM

## 2022-01-04 DIAGNOSIS — E55.9 VITAMIN D DEFICIENCY: ICD-10-CM

## 2022-01-04 DIAGNOSIS — M25.50 ARTHRALGIA OF MULTIPLE JOINTS: ICD-10-CM

## 2022-01-04 DIAGNOSIS — R79.82 ELEVATED C-REACTIVE PROTEIN (CRP): ICD-10-CM

## 2022-01-04 DIAGNOSIS — R52 BODY ACHES: ICD-10-CM

## 2022-01-04 DIAGNOSIS — M25.469 KNEE SWELLING: ICD-10-CM

## 2022-01-04 LAB
FERRITIN: 249.8 NG/ML (ref 13–150)
HEPATITIS C ANTIBODY INTERPRETATION: NORMAL
RHEUMATOID FACTOR: 15 IU/ML
VITAMIN B-12: 475 PG/ML (ref 211–946)
VITAMIN D 25-HYDROXY: 39.2 NG/ML

## 2022-01-04 PROCEDURE — 71046 X-RAY EXAM CHEST 2 VIEWS: CPT

## 2022-01-05 ENCOUNTER — OFFICE VISIT (OUTPATIENT)
Dept: INTERNAL MEDICINE | Age: 22
End: 2022-01-05
Payer: COMMERCIAL

## 2022-01-05 VITALS
SYSTOLIC BLOOD PRESSURE: 110 MMHG | HEART RATE: 124 BPM | WEIGHT: 147 LBS | DIASTOLIC BLOOD PRESSURE: 70 MMHG | BODY MASS INDEX: 26.89 KG/M2 | TEMPERATURE: 97.6 F | RESPIRATION RATE: 18 BRPM | OXYGEN SATURATION: 99 %

## 2022-01-05 DIAGNOSIS — R61 NIGHT SWEATS: ICD-10-CM

## 2022-01-05 DIAGNOSIS — R00.2 PALPITATIONS: ICD-10-CM

## 2022-01-05 DIAGNOSIS — R52 BODY ACHES: ICD-10-CM

## 2022-01-05 DIAGNOSIS — D63.8 CHRONIC DISEASE ANEMIA: ICD-10-CM

## 2022-01-05 DIAGNOSIS — R53.83 OTHER FATIGUE: ICD-10-CM

## 2022-01-05 DIAGNOSIS — R76.8 ELEVATED RHEUMATOID FACTOR: ICD-10-CM

## 2022-01-05 DIAGNOSIS — M25.40 JOINT SWELLING: ICD-10-CM

## 2022-01-05 DIAGNOSIS — R70.0 ELEVATED SED RATE: ICD-10-CM

## 2022-01-05 DIAGNOSIS — R79.82 ELEVATED C-REACTIVE PROTEIN (CRP): ICD-10-CM

## 2022-01-05 DIAGNOSIS — D75.838 REACTIVE THROMBOCYTOSIS: ICD-10-CM

## 2022-01-05 DIAGNOSIS — D72.829 LEUKOCYTOSIS, UNSPECIFIED TYPE: ICD-10-CM

## 2022-01-05 DIAGNOSIS — M79.10 MUSCLE ACHE: ICD-10-CM

## 2022-01-05 DIAGNOSIS — R00.0 SINUS TACHYCARDIA: ICD-10-CM

## 2022-01-05 DIAGNOSIS — M25.469 KNEE SWELLING: ICD-10-CM

## 2022-01-05 DIAGNOSIS — M25.50 ARTHRALGIA OF MULTIPLE JOINTS: ICD-10-CM

## 2022-01-05 DIAGNOSIS — M25.50 ARTHRALGIA OF MULTIPLE JOINTS: Primary | ICD-10-CM

## 2022-01-05 LAB
BILIRUBIN URINE: NEGATIVE
BLOOD, URINE: NEGATIVE
CLARITY: CLEAR
COLOR: YELLOW
GLUCOSE URINE: NEGATIVE MG/DL
KETONES, URINE: ABNORMAL MG/DL
LEUKOCYTE ESTERASE, URINE: NEGATIVE
NITRITE, URINE: NEGATIVE
PH UA: 7 (ref 5–8)
PROTEIN UA: NEGATIVE MG/DL
SPECIFIC GRAVITY UA: 1.02 (ref 1–1.03)
UROBILINOGEN, URINE: 1 E.U./DL

## 2022-01-05 PROCEDURE — 99214 OFFICE O/P EST MOD 30 MIN: CPT | Performed by: INTERNAL MEDICINE

## 2022-01-05 ASSESSMENT — ENCOUNTER SYMPTOMS
ABDOMINAL PAIN: 0
COUGH: 0
CHEST TIGHTNESS: 0
SORE THROAT: 0
WHEEZING: 0
CONSTIPATION: 0

## 2022-01-05 NOTE — PROGRESS NOTES
Chief Complaint   Patient presents with    Results     History of presenting illness:  Rose Marquez is a17 y.o. female who presents today for follow up on her chronic medical conditions as noted below.     Patient presents here today for follow-up after her initial visit yesterday on 1/4/21    When she was yesterday here she was complaining of joint aches etc.-see notes from yesterday  Subsequently we had ordered lab testing which came back yesterday and showed extremely high elevated sed rate and CRP, anemia and mild white cell count elevation with elevated platelets    HISTORY  ALL sx started after illness in September 2021  States was sick with URTI/ cold sx late September 2021  She describes this as cough/ congestion / high fever  Tested covid negative/ never took any meds  This illness lasted about 2-3 weeks  Then developed muscle aches / joint aches- especially her RT knee m( her rt knee was injured 5 yrs ago)  States her RT knee was \" hot\" and swollen  She felt that her LE muscles and joint LE- ankles, knees, hips were hurting and also left shoulder was hurting  States that all lthese joints are still hurting but better since she takes ibuprofen 200-400mg daily      Per mother -when she was visiting [facility] in [facility] in November she noticed that her daughter walks slowly with antalgic gait, mother was there again visiting her for [place] in December 2021 and states the symptoms were similar with her being slow, tired and walking with difficulty related to pain    Complaints today  States her joints feel all very stiff but RT knee is the one that hurts a lot  The joints that she is specially feels bothered about are her bilateral knees, especially right knee  Her bilateral hips  Her ankles feel stiff  Her left shoulder is hurting    Patient Active Problem List    Diagnosis Date Noted    Attention deficit disorder (ADD) without hyperactivity 08/09/2018    Need for vaccination 08/09/2018     No past medical history on file. Past Surgical History:   Procedure Laterality Date    ARM SURGERY      FEMUR FRACTURE SURGERY  2014    fell from horse     Current Outpatient Medications   Medication Sig Dispense Refill    levonorgestrel-ethinyl estradiol (LESSINA) 0.1-20 MG-MCG per tablet Take 1 tablet by mouth daily 3 packet 3    lisdexamfetamine (VYVANSE) 30 MG capsule Take 1 capsule by mouth every morning for 90 days. 90 capsule 0     No current facility-administered medications for this visit. No Known Allergies  Social History     Tobacco Use    Smoking status: Never Smoker    Smokeless tobacco: Never Used   Substance Use Topics    Alcohol use: Yes      No family history on file. Review of Systems   Constitutional: Positive for fatigue. Negative for chills and fever. HENT: Negative for congestion, ear pain, nosebleeds, postnasal drip and sore throat. Respiratory: Negative for cough, chest tightness and wheezing. Cardiovascular: Negative for chest pain, palpitations and leg swelling. Gastrointestinal: Negative for abdominal pain and constipation. Genitourinary: Negative for dysuria and urgency. Musculoskeletal: Positive for arthralgias, gait problem, myalgias and neck pain. Skin: Negative for rash. Neurological: Negative for dizziness and headaches. Psychiatric/Behavioral: Negative. Vitals:    01/05/22 0804   BP: 110/70   Site: Left Upper Arm   Position: Sitting   Cuff Size: Large Adult   Pulse: 124   Resp: 18   Temp: 97.6 °F (36.4 °C)   TempSrc: Tympanic   SpO2: 99%   Weight: 147 lb (66.7 kg)     Body mass index is 26.89 kg/m². Physical Exam  Constitutional:       Appearance: She is well-developed. HENT:      Right Ear: External ear normal.      Left Ear: External ear normal.      Mouth/Throat:      Pharynx: No oropharyngeal exudate. Eyes:      Conjunctiva/sclera: Conjunctivae normal.      Pupils: Pupils are equal, round, and reactive to light.    Neck:      Thyroid: No thyromegaly. Vascular: No JVD. Cardiovascular:      Rate and Rhythm: Normal rate. Heart sounds: Normal heart sounds. No murmur heard. Pulmonary:      Effort: No respiratory distress. Breath sounds: Normal breath sounds. No wheezing or rales. Chest:      Chest wall: No tenderness. Abdominal:      General: Bowel sounds are normal.      Palpations: Abdomen is soft. Musculoskeletal:      Cervical back: Neck supple. Comments: Right knee swelling is observed   Lymphadenopathy:      Cervical: No cervical adenopathy. Skin:     Findings: No rash.          Lab Review   Orders Only on 01/04/2022   Component Date Value    Vit D, 25-Hydroxy 01/04/2022 39.2     Rheumatoid Factor 01/04/2022 15.0*    Hep C Ab Interp 01/04/2022 Non-Reactive     Vitamin B-12 01/04/2022 475     Ferritin 01/04/2022 249.8*   Orders Only on 01/03/2022   Component Date Value    Sodium 01/03/2022 140     Potassium 01/03/2022 4.4     Chloride 01/03/2022 100     CO2 01/03/2022 28     Anion Gap 01/03/2022 12     Glucose 01/03/2022 80     BUN 01/03/2022 12     CREATININE 01/03/2022 0.6     GFR Non- 01/03/2022 >60     GFR  01/03/2022 >59     Calcium 01/03/2022 9.1     Total Protein 01/03/2022 7.7     Albumin 01/03/2022 3.2*    Total Bilirubin 01/03/2022 <0.2     Alkaline Phosphatase 01/03/2022 114*    ALT 01/03/2022 26     AST 01/03/2022 17     WBC 01/03/2022 13.2*    RBC 01/03/2022 4.25     Hemoglobin 01/03/2022 10.3*    Hematocrit 01/03/2022 36.2*    MCV 01/03/2022 85.2     MCH 01/03/2022 24.2*    MCHC 01/03/2022 28.5*    RDW 01/03/2022 13.3     Platelets 35/63/1769 789*    MPV 01/03/2022 8.3*    PLATELET SLIDE REVIEW 01/03/2022 Increased     Neutrophils % 01/03/2022 73.8*    Lymphocytes % 01/03/2022 14.1*    Monocytes % 01/03/2022 9.7     Eosinophils % 01/03/2022 1.5     Basophils % 01/03/2022 0.4     Neutrophils Absolute 01/03/2022 9.8*    Immature Granulocytes # 01/03/2022 0.1     Lymphocytes Absolute 01/03/2022 1.9     Monocytes Absolute 01/03/2022 1.30*    Eosinophils Absolute 01/03/2022 0.20     Basophils Absolute 01/03/2022 0.10     Hypochromia 01/03/2022 2+*    TSH 01/03/2022 0.951     Sed Rate 01/03/2022 102*    CRP 01/03/2022 16.76*           ASSESSMENT/PLAN:    Arthralgia of multiple joints  Joint swelling  Knee swelling  Elevated sed rate  Elevated C-reactive protein (CRP)  Body aches  Muscle aches  Fatigue  Night sweats  Reactive thrombocytosis  Anemia consistent with chronic disease anemia with elevated ferritin  Sinus tachycardia    Patient presents here today for follow-up after her initial visit yesterday  When she was yesterday here she was complaining of joint aches etc.-see notes from yesterday  Subsequently we had ordered lab testing which came back yesterday and showed extremely high elevated sed rate and CRP, anemia and mild white cell count elevation with elevated platelets    HISTORY  ALL sx started after illness in September 2021  States was sick with URTI/ cold sx late September 2021  She describes this as cough/ congestion / high fever  Tested covid negative/ never took any meds  This illness lasted about 2-3 weeks  Then developed muscle aches / joint aches- especially her RT knee m( her rt knee was injured 5 yrs ago)  States her RT knee was \" hot\" and swollen  She felt that her LE muscles and joint LE- ankles, knees, hips were hurting and also left shoulder was hurting  States that all lthese joints are still hurting but better since she takes ibuprofen 200-400mg daily      Per mother -when she was visiting [facility] in [facility] in November she noticed that her daughter walks slowly with antalgic gait, mother was there again visiting her for [place] in December 2021 and states the symptoms were similar with her being slow, tired and walking with difficulty related to pain    Complaints today  States her joints feel all very stiff but RT knee is the one that hurts a lot  The joints that she is specially feels bothered about are her bilateral knees, especially right knee  Her bilateral hips  Her ankles feel stiff  Her left shoulder is hurting    Denies fever or chills but does experience night sweats on some nights    Test results data available so far  CBC shows mild white cell count elevation 13.2, anemia hemoglobin 10.3/36.2 and elevated platelet 958  Additional testing shows that ferritin is elevated consistent with reactive/chronic disease anemia and reactive thrombocytosis  WBC 01/03/2022 13.2*   RBC 01/03/2022 4.25    Hemoglobin 01/03/2022 10.3*   Hematocrit 01/03/2022 36.2*   MCV 01/03/2022 85.2    MCH 01/03/2022 24.2*   MCHC 01/03/2022 28.5*   RDW 01/03/2022 13.3    Platelets 82/65/0152 789*     Inflammatory markers CRP and sed rate are elevated  Sed Rate 01/03/2022 102*   CRP 01/03/2022 16.76*     At this point we are still waiting for multiple test results to come back including  JENNIFER screen and reflex  Anticitrulline peptide antibody  Lyme titers  Parvovirus titers    Patient is pending appointment with orthopedics    Would suggest also to obtain urinalysis today  Suggest we obtain echocardiogram since patient is tachycardic    Further plans and recommendations once I receive additional lab results which I am hoping should be back by tomorrow    Proceed with rheumatology recommendations since connective tissue disease/rheumatoid arthritis with her symptoms presentation and currently available lab results is very likely    -     ECHO Complete 2D W Doppler W Color; Future  -     Urinalysis; Future    Note:  Right knee swelling is present but patient has had prior injury to right femur when she fell from horse 6 years ago  Knee x-ray yesterday shows  Impression   1. No acute bony abnormality. 2. Small amount of fluid in the suprapatellar bursa consistent with   joint effusion. 3. Intramedullary nail within the femur.    Signed by

## 2022-01-06 ENCOUNTER — PATIENT MESSAGE (OUTPATIENT)
Dept: INTERNAL MEDICINE | Age: 22
End: 2022-01-06

## 2022-01-06 ENCOUNTER — HOSPITAL ENCOUNTER (OUTPATIENT)
Dept: NON INVASIVE DIAGNOSTICS | Age: 22
Discharge: HOME | End: 2022-01-06
Payer: COMMERCIAL

## 2022-01-06 DIAGNOSIS — R00.2 PALPITATIONS: ICD-10-CM

## 2022-01-06 DIAGNOSIS — R00.0 SINUS TACHYCARDIA: ICD-10-CM

## 2022-01-06 DIAGNOSIS — R61 NIGHT SWEATS: ICD-10-CM

## 2022-01-06 LAB
APPEARANCE FLUID: NORMAL
CELL COUNT FLUID TYPE: NORMAL
CLOT EVALUATION: NORMAL
COLOR FLUID: NORMAL
CRYSTALS, FLUID: NORMAL
LV EF: 55 %
LVEF MODALITY: NORMAL
LYME, EIA: 0.4 LIV (ref 0–1.2)
MONOCYTE, FLUID: 7 %
NEUTROPHIL, FLUID: 93 %
NUCLEATED CELLS FLUID: NORMAL /CUMM
NUMBER OF CELLS COUNTED FLUID: 100
PATH CONSULT FLUID: NO
RBC FLUID: NORMAL /CUMM
SOURCE BODY FLUID: NORMAL

## 2022-01-06 PROCEDURE — 93306 TTE W/DOPPLER COMPLETE: CPT

## 2022-01-06 NOTE — LETTER
[facility]  [address] [place] [address]  Phone: [phone]  Fax: [phone]    Yoli Alfredo MD        January 10, 2022     Patient: Kiana Pinon   YOB: 2000   Date of Visit: 1/6/2022       To Whom it May Concern:    Amadou Sam was seen in my clinic on 01/05/2021. Mrs. Merlinda Bogaert is currently doing extensive testing to diagnose and treat systemic inflammation response with very high erythrocyte Sedimentation Rate and C-Reactive protein. We referred the patient to an Orthopedic physician, referred to a Rheumatologist as well. These appointments are taking some time to get into to be seen. The patient does have an appointment with the Rheumatologist on 01/18/2022. At this time we do see that she has come in contact with a virus that may be causing this response, but need the Rheumatologist to do further testing to verify if that is the correct reason behind this. Please excuse the patient for virtual options for schooling from 01/12/2022-01/26/2022. If you have any questions or concerns, please don't hesitate to call.     Sincerely,         Yoli Alfredo MD

## 2022-01-07 ENCOUNTER — TELEPHONE (OUTPATIENT)
Dept: INTERNAL MEDICINE | Age: 22
End: 2022-01-07

## 2022-01-07 LAB
ANA IGG, ELISA: NORMAL
CCP IGG ANTIBODIES: 3 UNITS (ref 0–19)
PARVOVIRUS B19 IGG ANTIBODY: 8.77 IV
PARVOVIRUS B19 IGM ANTIBODY: 0.56 IV

## 2022-01-07 NOTE — TELEPHONE ENCOUNTER
From: Tali Corona  To: Dr. Kathie Marrero: 1/6/2022 12:17 PM CST  Subject: Mak Batch note    Hi,  I was wondering if you would be able to write a doctors note that I can give my school explaining that for medical reasons I cant go back to [facility] yet. If I provide a doctors note I think I can do [employer] classes online until it is okay for me to return.

## 2022-01-07 NOTE — TELEPHONE ENCOUNTER
----- Message from Olga Deleon MD sent at 1/7/2022 12:20 PM CST -----  Please call patient re: echocardiogram results  Results are good    Please ask patient re: symptoms since yesterday  Fevers?   Please sen me message back/ I may need to add treatment  for weekend

## 2022-01-07 NOTE — TELEPHONE ENCOUNTER
She had fever last night, they went to [address] [place] [address] yesterday and they gave her a shot and antibiotics to take over the weekend and they will go back next week to see if its any better and if not they will be going back to surgery. Also she is going to be see Rheumatology soon.

## 2022-01-10 LAB
ANAEROBIC CULTURE: NORMAL
BODY FLUID CULTURE, STERILE: NORMAL
GRAM STAIN RESULT: NORMAL

## 2022-01-10 RX ORDER — PREDNISONE 10 MG/1
TABLET ORAL
Qty: 13 TABLET | Refills: 0 | Status: SHIPPED
Start: 2022-01-10 | End: 2022-07-19 | Stop reason: CLARIF

## 2022-01-10 NOTE — TELEPHONE ENCOUNTER
Spoke to [address] [place] [address] concerning the pt. They state that they have seen [address] again this morning. They state that she does not have an infection in the knee at this time. She has an appt with [email or web address] on 01/18/2021. Per Dr. Jason Ortega we are sending in Prednisone 10 BID for 5 days then decrease to once daily for 3 days. This medication has been sent to [facility]. Also discussed the pt's lab results with Kelli. 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 But the IgM is not detected at this time. This means the pt does not currently have the virus. The Parvo may have caused the inflammation response that the pt is having, but still need Trinh Lilly to see her and do further testing. Also pt is needing a detailed letter stating what her current medical conditions are so that she can send this to her school in [facility] [employer] [facility] in order to try to do a Hybrid option until she is released to go back.

## 2022-04-01 ENCOUNTER — TELEPHONE (OUTPATIENT)
Dept: INTERNAL MEDICINE | Age: 22
End: 2022-04-01

## 2022-04-01 NOTE — TELEPHONE ENCOUNTER
----- Message from Zoya Barajas sent at 4/1/2022  3:38 PM CDT -----  Subject: Message to Provider    QUESTIONS  Information for Provider? DORINA FROM [email or web address] CALLED TO SEE IF   OFFICE HAS RECEIVED Evgeny Burns FOR MEDICAL RECORDS RELEASE ,PLEASE CALL DORINA AT   [phone]  RE #[id number]  ---------------------------------------------------------------------------  --------------  CALL BACK INFO  What is the best way for the office to contact you? OK to leave message on   voicemail  Preferred Call Back Phone Number? [phone]  ---------------------------------------------------------------------------  --------------  SCRIPT ANSWERS  Relationship to Patient? Third Party  Third Party Type? Other  Other Third Party Type? [address] [facility] [employer]  Representative Name?  DORINA

## 2022-05-31 ENCOUNTER — E-VISIT (OUTPATIENT)
Dept: INTERNAL MEDICINE | Age: 22
End: 2022-05-31
Payer: MEDICAID

## 2022-05-31 DIAGNOSIS — R30.0 DYSURIA: Primary | ICD-10-CM

## 2022-05-31 DIAGNOSIS — N34.2 INFECTIVE URETHRITIS: ICD-10-CM

## 2022-05-31 PROCEDURE — 99422 OL DIG E/M SVC 11-20 MIN: CPT | Performed by: INTERNAL MEDICINE

## 2022-05-31 RX ORDER — CIPROFLOXACIN 250 MG/1
250 TABLET, FILM COATED ORAL 2 TIMES DAILY
Qty: 10 TABLET | Refills: 0 | Status: SHIPPED | OUTPATIENT
Start: 2022-05-31 | End: 2022-05-31

## 2022-05-31 RX ORDER — PHENAZOPYRIDINE HYDROCHLORIDE 200 MG/1
200 TABLET, FILM COATED ORAL 3 TIMES DAILY PRN
Qty: 6 TABLET | Refills: 0 | Status: SHIPPED | OUTPATIENT
Start: 2022-05-31 | End: 2022-06-03

## 2022-05-31 RX ORDER — CEFUROXIME AXETIL 250 MG/1
250 TABLET ORAL 2 TIMES DAILY
Qty: 10 TABLET | Refills: 0 | Status: SHIPPED | OUTPATIENT
Start: 2022-05-31 | End: 2022-06-05

## 2022-05-31 NOTE — PROGRESS NOTES
UTI SX for 24 hrs  Increased frequency/ burning    recommend   obtain ua + CX    RX ceftin 250 bid for 5 days  Pyridium 200 TID for 2 days

## 2022-07-19 ENCOUNTER — OFFICE VISIT (OUTPATIENT)
Dept: INTERNAL MEDICINE | Age: 22
End: 2022-07-19
Payer: MEDICAID

## 2022-07-19 VITALS
DIASTOLIC BLOOD PRESSURE: 72 MMHG | HEIGHT: 62 IN | BODY MASS INDEX: 28.71 KG/M2 | WEIGHT: 156 LBS | RESPIRATION RATE: 18 BRPM | SYSTOLIC BLOOD PRESSURE: 118 MMHG

## 2022-07-19 DIAGNOSIS — M02.39 REACTIVE ARTHRITIS OF MULTIPLE SITES (HCC): Primary | ICD-10-CM

## 2022-07-19 DIAGNOSIS — F98.8 ATTENTION DEFICIT DISORDER (ADD) WITHOUT HYPERACTIVITY: ICD-10-CM

## 2022-07-19 DIAGNOSIS — M25.461 BILATERAL KNEE SWELLING: ICD-10-CM

## 2022-07-19 DIAGNOSIS — D63.8 CHRONIC DISEASE ANEMIA: ICD-10-CM

## 2022-07-19 DIAGNOSIS — R53.83 OTHER FATIGUE: ICD-10-CM

## 2022-07-19 DIAGNOSIS — M25.462 BILATERAL KNEE SWELLING: ICD-10-CM

## 2022-07-19 DIAGNOSIS — R79.82 ELEVATED C-REACTIVE PROTEIN (CRP): ICD-10-CM

## 2022-07-19 DIAGNOSIS — M26.629 TMJ PAIN DYSFUNCTION SYNDROME: ICD-10-CM

## 2022-07-19 PROCEDURE — 99214 OFFICE O/P EST MOD 30 MIN: CPT | Performed by: INTERNAL MEDICINE

## 2022-07-19 RX ORDER — LEVONORGESTREL AND ETHINYL ESTRADIOL 0.1-0.02MG
1 KIT ORAL DAILY
Qty: 3 PACKET | Refills: 3 | Status: SHIPPED | OUTPATIENT
Start: 2022-07-19

## 2022-07-19 SDOH — ECONOMIC STABILITY: FOOD INSECURITY: WITHIN THE PAST 12 MONTHS, YOU WORRIED THAT YOUR FOOD WOULD RUN OUT BEFORE YOU GOT MONEY TO BUY MORE.: NEVER TRUE

## 2022-07-19 SDOH — ECONOMIC STABILITY: FOOD INSECURITY: WITHIN THE PAST 12 MONTHS, THE FOOD YOU BOUGHT JUST DIDN'T LAST AND YOU DIDN'T HAVE MONEY TO GET MORE.: NEVER TRUE

## 2022-07-19 ASSESSMENT — ENCOUNTER SYMPTOMS
SORE THROAT: 0
WHEEZING: 0
ABDOMINAL PAIN: 0
CONSTIPATION: 0
COUGH: 0
CHEST TIGHTNESS: 0

## 2022-07-19 ASSESSMENT — SOCIAL DETERMINANTS OF HEALTH (SDOH): HOW HARD IS IT FOR YOU TO PAY FOR THE VERY BASICS LIKE FOOD, HOUSING, MEDICAL CARE, AND HEATING?: NOT HARD AT ALL

## 2022-07-19 ASSESSMENT — PATIENT HEALTH QUESTIONNAIRE - PHQ9
SUM OF ALL RESPONSES TO PHQ QUESTIONS 1-9: 0
1. LITTLE INTEREST OR PLEASURE IN DOING THINGS: 0
2. FEELING DOWN, DEPRESSED OR HOPELESS: 0
SUM OF ALL RESPONSES TO PHQ QUESTIONS 1-9: 0
SUM OF ALL RESPONSES TO PHQ9 QUESTIONS 1 & 2: 0

## 2022-07-19 NOTE — PROGRESS NOTES
Chief Complaint   Patient presents with    Fatigue    Other     Joint pains, knee swelling       History of presenting illness:  Fredi Hurd is a17 y.o. female who presents today for follow up on her chronic medical conditions as noted below. Patient Active Problem List    Diagnosis Date Noted    Attention deficit disorder (ADD) without hyperactivity 08/09/2018    Need for vaccination 08/09/2018     No past medical history on file. Past Surgical History:   Procedure Laterality Date    ARM SURGERY      FEMUR FRACTURE SURGERY  2014    fell from horse     Current Outpatient Medications   Medication Sig Dispense Refill    levonorgestrel-ethinyl estradiol (LESSINA) 0.1-20 MG-MCG per tablet Take 1 tablet by mouth in the morning. 3 packet 3    lisdexamfetamine (VYVANSE) 30 MG capsule Take 1 capsule by mouth every morning for 90 days. 90 capsule 0     No current facility-administered medications for this visit. No Known Allergies  Social History     Tobacco Use    Smoking status: Never    Smokeless tobacco: Never   Substance Use Topics    Alcohol use: Yes      No family history on file. Review of Systems   Constitutional:  Positive for fatigue. Negative for chills and fever. HENT:  Negative for congestion, ear pain, nosebleeds, postnasal drip and sore throat. Respiratory:  Negative for cough, chest tightness and wheezing. Cardiovascular:  Negative for chest pain, palpitations and leg swelling. Gastrointestinal:  Negative for abdominal pain and constipation. Genitourinary:  Negative for dysuria and urgency. Musculoskeletal:  Positive for arthralgias. Skin:  Negative for rash. Neurological:  Negative for dizziness and headaches. Psychiatric/Behavioral: Negative. Vitals:    07/19/22 1318   BP: 118/72   Site: Left Upper Arm   Position: Sitting   Cuff Size: Large Adult   Resp: 18   Weight: 156 lb (70.8 kg)   Height: 5' 2\" (1.575 m)     Body mass index is 28.53 kg/m².     Physical Exam  Constitutional:       Appearance: She is well-developed. HENT:      Right Ear: External ear normal.      Left Ear: External ear normal.      Mouth/Throat:      Pharynx: No oropharyngeal exudate. Eyes:      Conjunctiva/sclera: Conjunctivae normal.      Pupils: Pupils are equal, round, and reactive to light. Neck:      Thyroid: No thyromegaly. Vascular: No JVD. Cardiovascular:      Rate and Rhythm: Normal rate. Heart sounds: Normal heart sounds. No murmur heard. Pulmonary:      Effort: No respiratory distress. Breath sounds: Normal breath sounds. No wheezing or rales. Chest:      Chest wall: No tenderness. Abdominal:      General: Bowel sounds are normal.      Palpations: Abdomen is soft. Musculoskeletal:      Cervical back: Neck supple. Comments: Swelling bilateral knees, mild present   Lymphadenopathy:      Cervical: No cervical adenopathy. Skin:     Findings: No rash. Lab Review   No visits with results within 2 Month(s) from this visit.    Latest known visit with results is:   Hospital Outpatient Visit on 01/06/2022   Component Date Value    Left Ventricular Ejectio* 01/06/2022 55     LVEF MODALITY 01/06/2022 ECHO            ASSESSMENT/PLAN:    Reactive arthritis of multiple sites (HCC)    Bilateral knee swelling      Elevated C-reactive protein (CRP)    Other fatigue    Anemia related to acute inflammatory state    Dr. Kirby Angeles records reviewed  Recent lab from June 14, 2022 CBC shows hemoglobin 10.5 and hematocrit 35.1  CRP elevated at 130  CMP is essentially normal normal GFR normal liver function tests  Since these last patient has been started on methotrexate after course of prednisone initially  She states that she is feeling better  Mother is here with patient today  They want to pursue referral to tertiary care center to further investigate reasons for her severe inflammatory symptoms, they question if the diagnosis is correct and what else could be triggering/causing her inflammatory arthritis/active arthritis  -     External Referral To Rheumatology  Patient understands that needs to continue appointments with Dr. Chelly Robb for now    TMJ pain dysfunction syndrome  States has had on and off TMJ pain for 5+ years  Recommendations for soft diet  Use topical anti-inflammatory gel  If symptoms not better would need to let us know     Attention deficit disorder (ADD) without hyperactivity    RX  -     lisdexamfetamine (VYVANSE) 30 MG capsule; Take 1 capsule by mouth every morning for 90 days. BCP  -     levonorgestrel-ethinyl estradiol (LESSINA) 0.1-20 MG-MCG per tablet; Take 1 tablet by mouth in the morning. Orders Placed This Encounter   Procedures    External Referral To Rheumatology       New Prescriptions    No medications on file         No follow-ups on file. There are no Patient Instructions on file for this visit. EMR Dragon/transcription disclaimer:Significant part of this  encounter note is electronic transcription/translationof spoken language to printed text. The electronic translation of spoken language may be erroneous, or at times, nonsensical words or phrases may be inadvertently transcribed.  Although I have reviewed the note for sucherrors, some may still exist.

## 2022-08-09 LAB
ALBUMIN SERPL-MCNC: 4.5 G/DL (ref 3.5–5.2)
ALP BLD-CCNC: 74 U/L (ref 35–104)
ALT SERPL-CCNC: 30 U/L (ref 5–33)
ANION GAP SERPL CALCULATED.3IONS-SCNC: 9 MMOL/L (ref 7–19)
AST SERPL-CCNC: 21 U/L (ref 5–32)
BASOPHILS ABSOLUTE: 0 K/UL (ref 0–0.2)
BASOPHILS RELATIVE PERCENT: 0.9 % (ref 0–1)
BILIRUB SERPL-MCNC: <0.2 MG/DL (ref 0.2–1.2)
BUN BLDV-MCNC: 12 MG/DL (ref 6–20)
C-REACTIVE PROTEIN: 1.87 MG/DL (ref 0–0.5)
CALCIUM SERPL-MCNC: 9.4 MG/DL (ref 8.6–10)
CHLORIDE BLD-SCNC: 104 MMOL/L (ref 98–111)
CO2: 26 MMOL/L (ref 22–29)
CREAT SERPL-MCNC: 0.6 MG/DL (ref 0.5–0.9)
EOSINOPHILS ABSOLUTE: 0.1 K/UL (ref 0–0.6)
EOSINOPHILS RELATIVE PERCENT: 2.4 % (ref 0–5)
GFR AFRICAN AMERICAN: >59
GFR NON-AFRICAN AMERICAN: >60
GLUCOSE BLD-MCNC: 90 MG/DL (ref 74–109)
HCT VFR BLD CALC: 39.8 % (ref 37–47)
HEMOGLOBIN: 12.8 G/DL (ref 12–16)
IMMATURE GRANULOCYTES #: 0 K/UL
LYMPHOCYTES ABSOLUTE: 1.5 K/UL (ref 1.1–4.5)
LYMPHOCYTES RELATIVE PERCENT: 32.2 % (ref 20–40)
MCH RBC QN AUTO: 26.5 PG (ref 27–31)
MCHC RBC AUTO-ENTMCNC: 32.2 G/DL (ref 33–37)
MCV RBC AUTO: 82.4 FL (ref 81–99)
MONOCYTES ABSOLUTE: 0.4 K/UL (ref 0–0.9)
MONOCYTES RELATIVE PERCENT: 8.7 % (ref 0–10)
NEUTROPHILS ABSOLUTE: 2.6 K/UL (ref 1.5–7.5)
NEUTROPHILS RELATIVE PERCENT: 55.6 % (ref 50–65)
PDW BLD-RTO: 19.9 % (ref 11.5–14.5)
PLATELET # BLD: 368 K/UL (ref 130–400)
PMV BLD AUTO: 9.3 FL (ref 9.4–12.3)
POTASSIUM SERPL-SCNC: 4.2 MMOL/L (ref 3.5–5)
RBC # BLD: 4.83 M/UL (ref 4.2–5.4)
SEDIMENTATION RATE, ERYTHROCYTE: 22 MM/HR (ref 0–20)
SODIUM BLD-SCNC: 139 MMOL/L (ref 136–145)
TOTAL PROTEIN: 7.4 G/DL (ref 6.6–8.7)
WBC # BLD: 4.6 K/UL (ref 4.8–10.8)

## 2022-09-29 ENCOUNTER — PATIENT MESSAGE (OUTPATIENT)
Dept: INTERNAL MEDICINE | Age: 22
End: 2022-09-29

## 2022-09-29 RX ORDER — VALACYCLOVIR HYDROCHLORIDE 1 G/1
1000 TABLET, FILM COATED ORAL 3 TIMES DAILY
Qty: 21 TABLET | Refills: 0 | Status: SHIPPED | OUTPATIENT
Start: 2022-09-29 | End: 2022-10-06

## 2022-09-29 NOTE — TELEPHONE ENCOUNTER
From: Estefani Haynes  To: Dr. Will Matute  Sent: 9/29/2022 7:24 AM CDT  Subject: Valtrex Refill    Hello! Could you please send a refill of Valtrex to any [facility] in [place]? It is what I take for cold sores sometimes.

## 2022-09-29 NOTE — TELEPHONE ENCOUNTER
Jey Wen called requesting a refill of the below medication which has been pended for you:     Requested Prescriptions     Pending Prescriptions Disp Refills    valACYclovir (VALTREX) 1 g tablet 21 tablet 0     Sig: Take 1 tablet by mouth 3 times daily for 7 days       Last Appointment Date: 7/19/2022  Next Appointment Date: Visit date not found    No Known Allergies

## 2022-11-17 RX ORDER — LEVONORGESTREL AND ETHINYL ESTRADIOL 0.1-0.02MG
1 KIT ORAL DAILY
Qty: 3 PACKET | Refills: 3 | Status: SHIPPED | OUTPATIENT
Start: 2022-11-17

## 2022-12-01 ENCOUNTER — TELEPHONE (OUTPATIENT)
Dept: INTERNAL MEDICINE | Age: 22
End: 2022-12-01

## 2022-12-01 NOTE — TELEPHONE ENCOUNTER
Pt mom called and is asking for a referral to a immunologist in [place] she is graduating and coming home this month

## 2022-12-01 NOTE — TELEPHONE ENCOUNTER
Pt mom notified to have Rheumatology send the referral to [facility] since they are the once that want her to go see immunologist

## 2022-12-01 NOTE — TELEPHONE ENCOUNTER
Immunologist she said the rheumatologist had referred her but she dont know who too and now she is coming back here

## 2023-01-16 ENCOUNTER — OFFICE VISIT (OUTPATIENT)
Dept: INTERNAL MEDICINE | Age: 23
End: 2023-01-16
Payer: MEDICAID

## 2023-01-16 VITALS
HEART RATE: 87 BPM | OXYGEN SATURATION: 100 % | RESPIRATION RATE: 18 BRPM | SYSTOLIC BLOOD PRESSURE: 122 MMHG | BODY MASS INDEX: 28.34 KG/M2 | HEIGHT: 62 IN | WEIGHT: 154 LBS | DIASTOLIC BLOOD PRESSURE: 80 MMHG

## 2023-01-16 DIAGNOSIS — Z00.00 ANNUAL PHYSICAL EXAM: Primary | ICD-10-CM

## 2023-01-16 DIAGNOSIS — M25.50 POLYARTHRALGIA: ICD-10-CM

## 2023-01-16 DIAGNOSIS — F98.8 ATTENTION DEFICIT DISORDER (ADD) WITHOUT HYPERACTIVITY: ICD-10-CM

## 2023-01-16 DIAGNOSIS — M02.39 REACTIVE ARTHRITIS OF MULTIPLE SITES (HCC): ICD-10-CM

## 2023-01-16 DIAGNOSIS — E83.52 HYPERCALCEMIA: Primary | ICD-10-CM

## 2023-01-16 DIAGNOSIS — R79.82 ELEVATED C-REACTIVE PROTEIN (CRP): ICD-10-CM

## 2023-01-16 LAB
ALBUMIN SERPL-MCNC: 5 G/DL (ref 3.5–5.2)
ALP BLD-CCNC: 64 U/L (ref 35–104)
ALT SERPL-CCNC: 14 U/L (ref 5–33)
ANION GAP SERPL CALCULATED.3IONS-SCNC: 9 MMOL/L (ref 7–19)
AST SERPL-CCNC: 17 U/L (ref 5–32)
BASOPHILS ABSOLUTE: 0 K/UL (ref 0–0.2)
BASOPHILS RELATIVE PERCENT: 0.6 % (ref 0–1)
BILIRUB SERPL-MCNC: 0.3 MG/DL (ref 0.2–1.2)
BUN BLDV-MCNC: 8 MG/DL (ref 6–20)
C-REACTIVE PROTEIN: <0.3 MG/DL (ref 0–0.5)
CALCIUM SERPL-MCNC: 10.9 MG/DL (ref 8.6–10)
CHLORIDE BLD-SCNC: 99 MMOL/L (ref 98–111)
CO2: 30 MMOL/L (ref 22–29)
CREAT SERPL-MCNC: 0.7 MG/DL (ref 0.5–0.9)
EOSINOPHILS ABSOLUTE: 0 K/UL (ref 0–0.6)
EOSINOPHILS RELATIVE PERCENT: 0.1 % (ref 0–5)
GFR SERPL CREATININE-BSD FRML MDRD: >60 ML/MIN/{1.73_M2}
GLUCOSE BLD-MCNC: 79 MG/DL (ref 74–109)
HCT VFR BLD CALC: 42.6 % (ref 37–47)
HEMOGLOBIN: 14.2 G/DL (ref 12–16)
IMMATURE GRANULOCYTES #: 0 K/UL
LYMPHOCYTES ABSOLUTE: 1.9 K/UL (ref 1.1–4.5)
LYMPHOCYTES RELATIVE PERCENT: 27.2 % (ref 20–40)
MCH RBC QN AUTO: 29 PG (ref 27–31)
MCHC RBC AUTO-ENTMCNC: 33.3 G/DL (ref 33–37)
MCV RBC AUTO: 86.9 FL (ref 81–99)
MONOCYTES ABSOLUTE: 0.5 K/UL (ref 0–0.9)
MONOCYTES RELATIVE PERCENT: 6.6 % (ref 0–10)
NEUTROPHILS ABSOLUTE: 4.6 K/UL (ref 1.5–7.5)
NEUTROPHILS RELATIVE PERCENT: 65.4 % (ref 50–65)
PDW BLD-RTO: 12.1 % (ref 11.5–14.5)
PLATELET # BLD: 332 K/UL (ref 130–400)
PMV BLD AUTO: 10.3 FL (ref 9.4–12.3)
POTASSIUM SERPL-SCNC: 4.6 MMOL/L (ref 3.5–5)
RBC # BLD: 4.9 M/UL (ref 4.2–5.4)
SEDIMENTATION RATE, ERYTHROCYTE: 8 MM/HR (ref 0–20)
SODIUM BLD-SCNC: 138 MMOL/L (ref 136–145)
TOTAL PROTEIN: 7.3 G/DL (ref 6.6–8.7)
WBC # BLD: 7 K/UL (ref 4.8–10.8)

## 2023-01-16 PROCEDURE — 99395 PREV VISIT EST AGE 18-39: CPT | Performed by: INTERNAL MEDICINE

## 2023-01-16 RX ORDER — DICLOFENAC EPOLAMINE 0.01 G/1
1 SYSTEM TOPICAL 2 TIMES DAILY
Qty: 30 PATCH | Refills: 1 | Status: SHIPPED | OUTPATIENT
Start: 2023-01-16

## 2023-01-16 RX ORDER — LEVONORGESTREL AND ETHINYL ESTRADIOL 0.1-0.02MG
1 KIT ORAL DAILY
Qty: 3 PACKET | Refills: 3 | Status: SHIPPED | OUTPATIENT
Start: 2023-01-16

## 2023-01-16 ASSESSMENT — ENCOUNTER SYMPTOMS
WHEEZING: 0
CONSTIPATION: 0
COUGH: 0
ABDOMINAL PAIN: 0
CHEST TIGHTNESS: 0
SORE THROAT: 0

## 2023-01-16 ASSESSMENT — PATIENT HEALTH QUESTIONNAIRE - PHQ9
2. FEELING DOWN, DEPRESSED OR HOPELESS: 0
SUM OF ALL RESPONSES TO PHQ QUESTIONS 1-9: 0
1. LITTLE INTEREST OR PLEASURE IN DOING THINGS: 0
SUM OF ALL RESPONSES TO PHQ QUESTIONS 1-9: 0
SUM OF ALL RESPONSES TO PHQ QUESTIONS 1-9: 0
SUM OF ALL RESPONSES TO PHQ9 QUESTIONS 1 & 2: 0
SUM OF ALL RESPONSES TO PHQ QUESTIONS 1-9: 0

## 2023-01-16 NOTE — PROGRESS NOTES
Chief Complaint:   Janice Espinoza is a 20 y.o. female who presents forcomplete physical exam.    History of Present Illness:      Janice Espinoza is a 20 y.o. female who presents todayfor wellness visit AND follow up on her chronic medical conditions as noted below. Patient Active Problem List    Diagnosis Date Noted    Attention deficit disorder (ADD) without hyperactivity 08/09/2018    Need for vaccination 08/09/2018       No past medical history on file. Past Surgical History:   Procedure Laterality Date    ARM SURGERY      FEMUR FRACTURE SURGERY  2014    fell from horse       Current Outpatient Medications   Medication Sig Dispense Refill    diclofenac (VOLTAREN) 50 MG EC tablet TAKE 1 TABLET BY MOUTH TWICE DAILY AS NEEDED      lisdexamfetamine (VYVANSE) 30 MG capsule Take 1 capsule by mouth every morning for 90 days. 90 capsule 0    levonorgestrel-ethinyl estradiol (LESSINA) 0.1-20 MG-MCG per tablet Take 1 tablet by mouth daily 3 packet 3    diclofenac (FLECTOR) 1.3 % PTCH patch Place 1 patch onto the skin 2 times daily 30 patch 1     No current facility-administered medications for this visit. No Known Allergies    Social History     Socioeconomic History    Marital status: Single     Spouse name: None    Number of children: None    Years of education: None    Highest education level: None   Tobacco Use    Smoking status: Never    Smokeless tobacco: Never   Substance and Sexual Activity    Alcohol use: Yes    Drug use: No    Sexual activity: Never     Social Determinants of Health     Financial Resource Strain: Low Risk     Difficulty of Paying Living Expenses: Not hard at all   Food Insecurity: No Food Insecurity    Worried About Running Out of Food in the Last Year: Never true    Ran Out of Food in the Last Year: Never true     No family history on file.        Past Surgical History:   Procedure Laterality Date    ARM SURGERY      FEMUR FRACTURE SURGERY  2014    fell from horse         Lab Review   No visits with results within 2 Month(s) from this visit. Latest known visit with results is:   Orders Only on 08/09/2022   Component Date Value    Sed Rate 08/09/2022 22 (A)          Review of Systems   Constitutional:  Positive for fatigue. Negative for chills and fever. HENT:  Negative for congestion, ear pain, nosebleeds, postnasal drip and sore throat. Respiratory:  Negative for cough, chest tightness and wheezing. Cardiovascular:  Negative for chest pain, palpitations and leg swelling. Gastrointestinal:  Negative for abdominal pain and constipation. Genitourinary:  Negative for dysuria and urgency. Musculoskeletal: Negative. Negative for arthralgias. Skin:  Negative for rash. Neurological:  Negative for dizziness and headaches. Psychiatric/Behavioral: Negative. Vitals:    01/16/23 1337   BP: 122/80   Site: Left Upper Arm   Position: Sitting   Cuff Size: Large Adult   Pulse: 87   Resp: 18   SpO2: 100%   Weight: 154 lb (69.9 kg)   Height: 5' 2\" (1.575 m)      Wt Readings from Last 3 Encounters:   01/16/23 154 lb (69.9 kg)   07/19/22 156 lb (70.8 kg)   01/05/22 147 lb (66.7 kg)   Body mass index is 28.17 kg/m². BP Readings from Last 3 Encounters:   01/16/23 122/80   07/19/22 118/72   01/05/22 110/70       Physical Exam  Constitutional:       Appearance: She is well-developed. HENT:      Right Ear: External ear normal.      Left Ear: External ear normal.      Mouth/Throat:      Pharynx: No oropharyngeal exudate. Eyes:      Conjunctiva/sclera: Conjunctivae normal.      Pupils: Pupils are equal, round, and reactive to light. Neck:      Thyroid: No thyromegaly. Vascular: No JVD. Cardiovascular:      Rate and Rhythm: Normal rate. Heart sounds: Normal heart sounds. No murmur heard. Pulmonary:      Effort: No respiratory distress. Breath sounds: Normal breath sounds. No wheezing or rales. Chest:      Chest wall: No tenderness.    Abdominal:      General: Bowel sounds are normal.      Palpations: Abdomen is soft. Musculoskeletal:      Cervical back: Neck supple. Lymphadenopathy:      Cervical: No cervical adenopathy. Skin:     Findings: No rash. Neurological:      Mental Status: She is oriented to person, place, and time. ASSESSMENT/PLAN    Annual physical exam  PAP 8/2021  Elevated C-reactive protein (CRP)    Polyarthralgia    Reactive arthritis of multiple sites [facility])    Seen rheumatology  (following highlighted text has been copied from this specialist note)  Diagnosis Plan   1. Rheumatoid factor positive Cyclic Citrul Peptide Antibody IgG   Rheumatoid Factor, Plasma   Antinuclear Antibody (JENNIFER), HEp-2, IgG   Smith (HYACINTH) Antibody, IgG   IgE Antibody, IgG   IgG   D DIMER, QUANTITATIVE   C-reactive protein   Sedimentation Rate, Automated   Allergy & Immunology     2. Bilateral knee swelling Ambulatory referral to Rheumatology     3. Polyarthralgia     +RF (15 per referral)  -Dad has hep C, unsure if she was exposed but has had negative hep panel in the past  -will get rheum labs  -no s/s of lupus or Sjgroens    2. Bilateral knee swelling  - positive d-dimer in January (>2)  -no swelling today or hx of blood clots, miscarriages, etc.   -repeat d dimer    3.  Polyarthralgia  -found to have +IGG mold panel, from virtual MD in [place] (suggest 21 days of abx)  -since changing her diet and intermittent prednisone has had significant improvement  -labs today  -likely immunology referral    RTC 2 months pending labs    Pt getting repeat lab done here today  She stopped methotrexate 10/0222  Has remained mostly pain free  No recurrent knee swelling    States will get immunology appt as recommended per rheumatology    Remains on healthy plant based diet/ preservative  free    Attention deficit disorder (ADD) without hyperactivity  Refill Vyvanse prn use  Nevaeh Arellano reviewed  Orders Placed This Encounter   Procedures    C-Reactive Protein    Sedimentation Rate    CBC with Auto Differential    Comprehensive Metabolic Panel       New Prescriptions    DICLOFENAC (FLECTOR) 1.3 % PTCH PATCH    Place 1 patch onto the skin 2 times daily      There are no Patient Instructions on file for this visit. No follow-ups on file. EMR Dragon/transcription disclaimer:Significant part of this  encounter note is electronic transcription/translation of spoken language to printed text. The electronic translation of spoken language may beerroneous, or at times, nonsensical words or phrases may be inadvertently transcribed.  Although I have reviewed the note for such errors, some may still exist.

## 2023-03-03 DIAGNOSIS — E83.52 HYPERCALCEMIA: ICD-10-CM

## 2023-03-03 LAB — CALCIUM IONIZED: 1.16 MMOL/L (ref 1.12–1.32)

## 2023-05-31 ENCOUNTER — OFFICE VISIT (OUTPATIENT)
Dept: INTERNAL MEDICINE | Age: 23
End: 2023-05-31
Payer: MEDICAID

## 2023-05-31 VITALS
RESPIRATION RATE: 18 BRPM | OXYGEN SATURATION: 98 % | BODY MASS INDEX: 27.05 KG/M2 | HEIGHT: 62 IN | SYSTOLIC BLOOD PRESSURE: 112 MMHG | WEIGHT: 147 LBS | DIASTOLIC BLOOD PRESSURE: 80 MMHG | HEART RATE: 93 BPM

## 2023-05-31 DIAGNOSIS — F98.8 ATTENTION DEFICIT DISORDER (ADD) WITHOUT HYPERACTIVITY: Primary | ICD-10-CM

## 2023-05-31 DIAGNOSIS — Z79.899 MEDICATION MANAGEMENT: ICD-10-CM

## 2023-05-31 PROCEDURE — 99213 OFFICE O/P EST LOW 20 MIN: CPT | Performed by: INTERNAL MEDICINE

## 2023-05-31 PROCEDURE — 80305 DRUG TEST PRSMV DIR OPT OBS: CPT | Performed by: INTERNAL MEDICINE

## 2023-05-31 RX ORDER — VALACYCLOVIR HYDROCHLORIDE 1 G/1
1000 TABLET, FILM COATED ORAL 3 TIMES DAILY
Qty: 21 TABLET | Refills: 0 | Status: SHIPPED | OUTPATIENT
Start: 2023-05-31 | End: 2023-06-07

## 2023-05-31 ASSESSMENT — ENCOUNTER SYMPTOMS
CHEST TIGHTNESS: 0
SORE THROAT: 0
COUGH: 0
CONSTIPATION: 0
WHEEZING: 0
ABDOMINAL PAIN: 0

## 2023-05-31 NOTE — PROGRESS NOTES
Negative. Vitals:    05/31/23 1147   BP: 112/80   Site: Left Upper Arm   Position: Sitting   Cuff Size: Large Adult   Pulse: 93   Resp: 18   SpO2: 98%   Weight: 147 lb (66.7 kg)   Height: 5' 2\" (1.575 m)     Body mass index is 26.89 kg/m². Physical Exam  Constitutional:       Appearance: She is well-developed. HENT:      Right Ear: External ear normal.      Left Ear: External ear normal.      Mouth/Throat:      Pharynx: No oropharyngeal exudate. Eyes:      Conjunctiva/sclera: Conjunctivae normal.      Pupils: Pupils are equal, round, and reactive to light. Neck:      Thyroid: No thyromegaly. Vascular: No JVD. Cardiovascular:      Rate and Rhythm: Normal rate. Heart sounds: Normal heart sounds. No murmur heard. Pulmonary:      Effort: No respiratory distress. Breath sounds: Normal breath sounds. No wheezing or rales. Chest:      Chest wall: No tenderness. Abdominal:      General: Bowel sounds are normal.      Palpations: Abdomen is soft. Musculoskeletal:      Cervical back: Neck supple. Lymphadenopathy:      Cervical: No cervical adenopathy. Skin:     Findings: No rash. Neurological:      Mental Status: She is oriented to person, place, and time. Lab Review   No visits with results within 2 Month(s) from this visit. Latest known visit with results is:   Orders Only on 03/03/2023   Component Date Value    Calcium, Ionized 03/03/2023 1.16            ASSESSMENT/PLAN:    Attention deficit disorder (ADD) without hyperactivity  Refill Vyvanse prn use  Ang Washington reviewed       Reactive arthritis of multiple sites [facility])     Seen rheumatology 9/2022  Seen my prior office notes  This time is feeling better and not going back for fu or making immunology appt  Remains on healthy plant based diet/ preservative  free       Orders Placed This Encounter   Procedures    POCT Rapid Drug Screen     New Prescriptions    No medications on file         No follow-ups on file.    There are no

## 2023-09-18 ENCOUNTER — OFFICE VISIT (OUTPATIENT)
Dept: INTERNAL MEDICINE | Age: 23
End: 2023-09-18
Payer: COMMERCIAL

## 2023-09-18 VITALS
OXYGEN SATURATION: 97 % | WEIGHT: 146.6 LBS | DIASTOLIC BLOOD PRESSURE: 72 MMHG | BODY MASS INDEX: 26.98 KG/M2 | SYSTOLIC BLOOD PRESSURE: 110 MMHG | HEART RATE: 95 BPM | HEIGHT: 62 IN

## 2023-09-18 DIAGNOSIS — F98.8 ATTENTION DEFICIT DISORDER (ADD) WITHOUT HYPERACTIVITY: Primary | ICD-10-CM

## 2023-09-18 DIAGNOSIS — M25.50 POLYARTHRALGIA: ICD-10-CM

## 2023-09-18 DIAGNOSIS — R53.83 OTHER FATIGUE: ICD-10-CM

## 2023-09-18 DIAGNOSIS — M02.39 REACTIVE ARTHRITIS OF MULTIPLE SITES (HCC): ICD-10-CM

## 2023-09-18 PROCEDURE — 99213 OFFICE O/P EST LOW 20 MIN: CPT | Performed by: INTERNAL MEDICINE

## 2023-09-18 RX ORDER — LISDEXAMFETAMINE DIMESYLATE CAPSULES 30 MG/1
30 CAPSULE ORAL EVERY MORNING
Qty: 90 CAPSULE | Refills: 0 | Status: SHIPPED | OUTPATIENT
Start: 2023-09-18 | End: 2023-09-18

## 2023-09-18 RX ORDER — LISDEXAMFETAMINE DIMESYLATE CAPSULES 20 MG/1
20 CAPSULE ORAL DAILY
Qty: 30 CAPSULE | Refills: 0 | Status: SHIPPED | OUTPATIENT
Start: 2023-09-18 | End: 2023-10-18

## 2023-09-18 RX ORDER — LEVONORGESTREL AND ETHINYL ESTRADIOL 0.1-0.02MG
1 KIT ORAL DAILY
Qty: 3 PACKET | Refills: 0 | Status: SHIPPED | OUTPATIENT
Start: 2023-09-18

## 2023-09-18 ASSESSMENT — ENCOUNTER SYMPTOMS
CONSTIPATION: 0
SORE THROAT: 0
COUGH: 0
CHEST TIGHTNESS: 0
WHEEZING: 0
ABDOMINAL PAIN: 0

## 2023-09-18 NOTE — PROGRESS NOTES
Chief Complaint   Patient presents with    Other     Med refill      History of presenting illness:  Simba Gomez is a19 y.o. female who presents today for follow up on her chronic medical conditions as noted below. Patient Active Problem List    Diagnosis Date Noted    Attention deficit disorder (ADD) without hyperactivity 08/09/2018    Need for vaccination 08/09/2018     No past medical history on file. Past Surgical History:   Procedure Laterality Date    ARM SURGERY      FEMUR FRACTURE SURGERY  2014    fell from horse     Current Outpatient Medications   Medication Sig Dispense Refill    lisdexamfetamine (VYVANSE) 30 MG capsule Take 1 capsule by mouth every morning for 90 days. 90 capsule 0    diclofenac (VOLTAREN) 50 MG EC tablet TAKE 1 TABLET BY MOUTH TWICE DAILY AS NEEDED      levonorgestrel-ethinyl estradiol (LESSINA) 0.1-20 MG-MCG per tablet Take 1 tablet by mouth daily 3 packet 3    diclofenac (FLECTOR) 1.3 % PTCH patch Place 1 patch onto the skin 2 times daily 30 patch 1     No current facility-administered medications for this visit. No Known Allergies  Social History     Tobacco Use    Smoking status: Never    Smokeless tobacco: Never   Substance Use Topics    Alcohol use: Yes      No family history on file. Review of Systems   Constitutional:  Negative for chills, fatigue and fever. HENT:  Negative for congestion, ear pain, nosebleeds, postnasal drip and sore throat. Respiratory:  Negative for cough, chest tightness and wheezing. Cardiovascular:  Negative for chest pain, palpitations and leg swelling. Gastrointestinal:  Negative for abdominal pain and constipation. Genitourinary:  Negative for dysuria and urgency. Musculoskeletal: Negative. Negative for arthralgias. Skin:  Negative for rash. Neurological:  Negative for dizziness and headaches. Psychiatric/Behavioral: Negative.        Vitals:    09/18/23 0956   Pulse: 95   SpO2: 97%   Weight: 146 lb 9.6 oz (66.5

## 2023-11-03 DIAGNOSIS — F98.8 ATTENTION DEFICIT DISORDER (ADD) WITHOUT HYPERACTIVITY: ICD-10-CM

## 2023-11-03 RX ORDER — LISDEXAMFETAMINE DIMESYLATE CAPSULES 20 MG/1
20 CAPSULE ORAL DAILY
Qty: 30 CAPSULE | Refills: 0 | Status: SHIPPED | OUTPATIENT
Start: 2023-11-03 | End: 2023-12-03

## 2023-11-03 NOTE — TELEPHONE ENCOUNTER
Evangelistky Severs called to request a refill on her medication. Last office visit : 9/18/2023   Next office visit : 1/16/2024     Requested Prescriptions     Pending Prescriptions Disp Refills    Lisdexamfetamine Dimesylate (VYVANSE) 20 MG CAPS 30 capsule 0     Sig: Take 1 capsule by mouth daily for 30 days.  Max Daily Amount: 20 mg            Stephanie Kimbrough, [address] [facility] Adequate: hears normal conversation without difficulty

## 2024-05-09 ENCOUNTER — OFFICE VISIT (OUTPATIENT)
Dept: INTERNAL MEDICINE | Age: 24
End: 2024-05-09
Payer: COMMERCIAL

## 2024-05-09 VITALS
DIASTOLIC BLOOD PRESSURE: 80 MMHG | BODY MASS INDEX: 28.12 KG/M2 | HEIGHT: 62 IN | WEIGHT: 152.8 LBS | OXYGEN SATURATION: 98 % | SYSTOLIC BLOOD PRESSURE: 120 MMHG | HEART RATE: 85 BPM

## 2024-05-09 DIAGNOSIS — M02.39 REACTIVE ARTHRITIS OF MULTIPLE SITES (HCC): ICD-10-CM

## 2024-05-09 DIAGNOSIS — Z79.899 MEDICATION MANAGEMENT: ICD-10-CM

## 2024-05-09 DIAGNOSIS — Z00.00 ANNUAL PHYSICAL EXAM: Primary | ICD-10-CM

## 2024-05-09 DIAGNOSIS — M25.50 POLYARTHRALGIA: ICD-10-CM

## 2024-05-09 DIAGNOSIS — R53.83 OTHER FATIGUE: ICD-10-CM

## 2024-05-09 DIAGNOSIS — F98.8 ATTENTION DEFICIT DISORDER (ADD) WITHOUT HYPERACTIVITY: ICD-10-CM

## 2024-05-09 LAB
25(OH)D3 SERPL-MCNC: 31.6 NG/ML
ALBUMIN SERPL-MCNC: 4.7 G/DL (ref 3.5–5.2)
ALP SERPL-CCNC: 70 U/L (ref 35–104)
ALT SERPL-CCNC: 12 U/L (ref 5–33)
ANION GAP SERPL CALCULATED.3IONS-SCNC: 11 MMOL/L (ref 7–19)
AST SERPL-CCNC: 16 U/L (ref 5–32)
BASOPHILS # BLD: 0.1 K/UL (ref 0–0.2)
BASOPHILS NFR BLD: 0.8 % (ref 0–1)
BILIRUB SERPL-MCNC: 0.3 MG/DL (ref 0.2–1.2)
BILIRUB UR QL STRIP: NEGATIVE
BUN SERPL-MCNC: 12 MG/DL (ref 6–20)
CALCIUM SERPL-MCNC: 9.8 MG/DL (ref 8.6–10)
CHLORIDE SERPL-SCNC: 101 MMOL/L (ref 98–111)
CLARITY UR: ABNORMAL
CO2 SERPL-SCNC: 26 MMOL/L (ref 22–29)
COLOR UR: YELLOW
CREAT SERPL-MCNC: 0.8 MG/DL (ref 0.5–0.9)
EOSINOPHIL # BLD: 0.1 K/UL (ref 0–0.6)
EOSINOPHIL NFR BLD: 0.7 % (ref 0–5)
ERYTHROCYTE [DISTWIDTH] IN BLOOD BY AUTOMATED COUNT: 11.7 % (ref 11.5–14.5)
ERYTHROCYTE [SEDIMENTATION RATE] IN BLOOD BY WESTERGREN METHOD: 7 MM/HR (ref 0–20)
GLUCOSE SERPL-MCNC: 81 MG/DL (ref 74–109)
GLUCOSE UR STRIP.AUTO-MCNC: NEGATIVE MG/DL
HCT VFR BLD AUTO: 42.8 % (ref 37–47)
HGB BLD-MCNC: 14 G/DL (ref 12–16)
HGB UR STRIP.AUTO-MCNC: NEGATIVE MG/L
IMM GRANULOCYTES # BLD: 0 K/UL
KETONES UR STRIP.AUTO-MCNC: NEGATIVE MG/DL
LEUKOCYTE ESTERASE UR QL STRIP.AUTO: NEGATIVE
LYMPHOCYTES # BLD: 2.5 K/UL (ref 1.1–4.5)
LYMPHOCYTES NFR BLD: 29.9 % (ref 20–40)
MCH RBC QN AUTO: 28.3 PG (ref 27–31)
MCHC RBC AUTO-ENTMCNC: 32.7 G/DL (ref 33–37)
MCV RBC AUTO: 86.5 FL (ref 81–99)
MONOCYTES # BLD: 0.7 K/UL (ref 0–0.9)
MONOCYTES NFR BLD: 7.9 % (ref 0–10)
NEUTROPHILS # BLD: 5 K/UL (ref 1.5–7.5)
NEUTS SEG NFR BLD: 60.5 % (ref 50–65)
NITRITE UR QL STRIP.AUTO: NEGATIVE
PH UR STRIP.AUTO: 7 [PH] (ref 5–8)
PLATELET # BLD AUTO: 335 K/UL (ref 130–400)
PMV BLD AUTO: 9.3 FL (ref 9.4–12.3)
POTASSIUM SERPL-SCNC: 4.3 MMOL/L (ref 3.5–5)
PROT SERPL-MCNC: 7.5 G/DL (ref 6.6–8.7)
PROT UR STRIP.AUTO-MCNC: NEGATIVE MG/DL
RBC # BLD AUTO: 4.95 M/UL (ref 4.2–5.4)
SODIUM SERPL-SCNC: 138 MMOL/L (ref 136–145)
SP GR UR STRIP.AUTO: 1.02 (ref 1–1.03)
TSH SERPL DL<=0.005 MIU/L-ACNC: 1.12 UIU/ML (ref 0.27–4.2)
UROBILINOGEN UR STRIP.AUTO-MCNC: 0.2 E.U./DL
VIT B12 SERPL-MCNC: 833 PG/ML (ref 211–946)
WBC # BLD AUTO: 8.3 K/UL (ref 4.8–10.8)

## 2024-05-09 PROCEDURE — 99395 PREV VISIT EST AGE 18-39: CPT | Performed by: INTERNAL MEDICINE

## 2024-05-09 RX ORDER — LISDEXAMFETAMINE DIMESYLATE CAPSULES 20 MG/1
20 CAPSULE ORAL DAILY
Qty: 90 CAPSULE | Refills: 0 | Status: SHIPPED | OUTPATIENT
Start: 2024-05-09 | End: 2024-08-07

## 2024-05-09 SDOH — ECONOMIC STABILITY: FOOD INSECURITY: WITHIN THE PAST 12 MONTHS, YOU WORRIED THAT YOUR FOOD WOULD RUN OUT BEFORE YOU GOT MONEY TO BUY MORE.: NEVER TRUE

## 2024-05-09 SDOH — ECONOMIC STABILITY: HOUSING INSECURITY
IN THE LAST 12 MONTHS, WAS THERE A TIME WHEN YOU DID NOT HAVE A STEADY PLACE TO SLEEP OR SLEPT IN A SHELTER (INCLUDING NOW)?: NO

## 2024-05-09 SDOH — ECONOMIC STABILITY: FOOD INSECURITY: WITHIN THE PAST 12 MONTHS, THE FOOD YOU BOUGHT JUST DIDN'T LAST AND YOU DIDN'T HAVE MONEY TO GET MORE.: NEVER TRUE

## 2024-05-09 SDOH — ECONOMIC STABILITY: INCOME INSECURITY: HOW HARD IS IT FOR YOU TO PAY FOR THE VERY BASICS LIKE FOOD, HOUSING, MEDICAL CARE, AND HEATING?: NOT HARD AT ALL

## 2024-05-09 ASSESSMENT — ENCOUNTER SYMPTOMS
WHEEZING: 0
COUGH: 0
SORE THROAT: 0
CHEST TIGHTNESS: 0
CONSTIPATION: 0
ABDOMINAL PAIN: 0

## 2024-05-09 ASSESSMENT — PATIENT HEALTH QUESTIONNAIRE - PHQ9
2. FEELING DOWN, DEPRESSED OR HOPELESS: NOT AT ALL
SUM OF ALL RESPONSES TO PHQ9 QUESTIONS 1 & 2: 0
SUM OF ALL RESPONSES TO PHQ QUESTIONS 1-9: 0
SUM OF ALL RESPONSES TO PHQ QUESTIONS 1-9: 0
1. LITTLE INTEREST OR PLEASURE IN DOING THINGS: NOT AT ALL
SUM OF ALL RESPONSES TO PHQ QUESTIONS 1-9: 0
SUM OF ALL RESPONSES TO PHQ QUESTIONS 1-9: 0

## 2024-05-09 NOTE — PROGRESS NOTES
Chief Complaint:   Betsy Sanches is a 23 y.o. female who presents forcomplete physical exam.    History of Present Illness:      Betsy Sanches is a 23 y.o. female who presents todayfor wellness visit AND follow up on her chronic medical conditions as noted below.    Patient Active Problem List    Diagnosis Date Noted    Attention deficit disorder (ADD) without hyperactivity 08/09/2018    Need for vaccination 08/09/2018       No past medical history on file.    Past Surgical History:   Procedure Laterality Date    ARM SURGERY      FEMUR FRACTURE SURGERY  2014    fell from horse       Current Outpatient Medications   Medication Sig Dispense Refill    Lisdexamfetamine Dimesylate (VYVANSE) 20 MG CAPS Take 1 capsule by mouth daily for 90 days. Max Daily Amount: 20 mg 90 capsule 0    levonorgestrel-ethinyl estradiol (LESSINA) 0.1-20 MG-MCG per tablet Take 1 tablet by mouth daily 3 packet 1     No current facility-administered medications for this visit.     No Known Allergies    Social History     Socioeconomic History    Marital status: Single     Spouse name: None    Number of children: None    Years of education: None    Highest education level: None   Tobacco Use    Smoking status: Never    Smokeless tobacco: Never   Substance and Sexual Activity    Alcohol use: Yes    Drug use: No    Sexual activity: Never     Social Determinants of Health     Financial Resource Strain: Low Risk  (5/9/2024)    Overall Financial Resource Strain (CARDIA)     Difficulty of Paying Living Expenses: Not hard at all   Food Insecurity: No Food Insecurity (5/9/2024)    Hunger Vital Sign     Worried About Running Out of Food in the Last Year: Never true     Ran Out of Food in the Last Year: Never true   Transportation Needs: Unknown (5/9/2024)    PRAPARE - Transportation     Lack of Transportation (Non-Medical): No   Housing Stability: Unknown (5/9/2024)    Housing Stability Vital Sign     Unstable Housing in the Last Year: No     No family

## 2024-05-11 LAB
AMPHET CTO UR CFM-MCNC: NORMAL NG/ML
BARBITURATES CTO UR CFM-MCNC: NEGATIVE NG/ML
BENZODIAZ CTO UR CFM-MCNC: NEGATIVE NG/ML
BUPRENORPHINE UR QL SCN: NEGATIVE NG/ML
CANNABINOIDS CTO UR CFM-MCNC: NEGATIVE NG/ML
CARISOPRODOL UR QL: NEGATIVE NG/ML
COCAINE CTO UR CFM-MCNC: NEGATIVE NG/ML
CREAT UR-MCNC: 161.3 MG/DL (ref 20–400)
DRUG SCREEN COMMENT UR-IMP: NORMAL
ETHYL GLUCURONIDE UR QL SCN: NEGATIVE NG/ML
FENTANYL UR QL: NEGATIVE NG/ML
MEPERIDINE UR QL: NEGATIVE NG/ML
METHADONE CTO UR CFM-MCNC: NEGATIVE NG/ML
OPIATES UR QL SCN: NEGATIVE NG/ML
OXYCODONE SCREEN URINE: NEGATIVE NG/ML
PCP CTO UR CFM-MCNC: NEGATIVE NG/ML
PROPOXYPH CTO UR CFM-MCNC: NEGATIVE NG/ML
TAPENTADOL UR QL SCN: NEGATIVE NG/ML
TRAMADOL SCREEN URINE: NEGATIVE NG/ML
ZOLPIDEM UR QL SCN: NEGATIVE NG/ML

## 2024-05-14 LAB
AMPHET CTO UR CFM-MCNC: NORMAL NG/ML
AMPHET UR-MCNC: 1328 NG/ML
BARBITURATES CTO UR CFM-MCNC: NEGATIVE NG/ML
BENZODIAZ CTO UR CFM-MCNC: NEGATIVE NG/ML
BUPRENORPHINE UR QL SCN: NEGATIVE NG/ML
CANNABINOIDS CTO UR CFM-MCNC: NEGATIVE NG/ML
CARISOPRODOL UR QL: NEGATIVE NG/ML
COCAINE CTO UR CFM-MCNC: NEGATIVE NG/ML
CREAT UR-MCNC: 161.3 MG/DL (ref 20–400)
DRUG SCREEN COMMENT UR-IMP: NORMAL
ETHYL GLUCURONIDE UR QL SCN: NEGATIVE NG/ML
FENTANYL UR QL: NEGATIVE NG/ML
MDA UR-MCNC: <200 NG/ML
MDEA UR-MCNC: <200 NG/ML
MDMA UR-MCNC: <200 NG/ML
MEPERIDINE UR QL: NEGATIVE NG/ML
METHADONE CTO UR CFM-MCNC: NEGATIVE NG/ML
METHAMPHET UR-MCNC: <200 NG/ML
OPIATES UR QL SCN: NEGATIVE NG/ML
OXYCODONE SCREEN URINE: NEGATIVE NG/ML
PCP CTO UR CFM-MCNC: NEGATIVE NG/ML
PHENTERMINE UR CFM-MCNC: <200 NG/ML
PROPOXYPH CTO UR CFM-MCNC: NEGATIVE NG/ML
TAPENTADOL UR QL SCN: NEGATIVE NG/ML
TRAMADOL SCREEN URINE: NEGATIVE NG/ML
ZOLPIDEM UR QL SCN: NEGATIVE NG/ML

## 2024-09-09 ENCOUNTER — OFFICE VISIT (OUTPATIENT)
Dept: INTERNAL MEDICINE | Age: 24
End: 2024-09-09
Payer: COMMERCIAL

## 2024-09-09 VITALS
DIASTOLIC BLOOD PRESSURE: 78 MMHG | HEART RATE: 81 BPM | OXYGEN SATURATION: 99 % | BODY MASS INDEX: 28.93 KG/M2 | HEIGHT: 62 IN | WEIGHT: 157.2 LBS | SYSTOLIC BLOOD PRESSURE: 110 MMHG

## 2024-09-09 DIAGNOSIS — F98.8 ATTENTION DEFICIT DISORDER (ADD) WITHOUT HYPERACTIVITY: ICD-10-CM

## 2024-09-09 PROCEDURE — G8419 CALC BMI OUT NRM PARAM NOF/U: HCPCS | Performed by: INTERNAL MEDICINE

## 2024-09-09 PROCEDURE — G8427 DOCREV CUR MEDS BY ELIG CLIN: HCPCS | Performed by: INTERNAL MEDICINE

## 2024-09-09 PROCEDURE — 99213 OFFICE O/P EST LOW 20 MIN: CPT | Performed by: INTERNAL MEDICINE

## 2024-09-09 PROCEDURE — 1036F TOBACCO NON-USER: CPT | Performed by: INTERNAL MEDICINE

## 2024-09-09 RX ORDER — LISDEXAMFETAMINE DIMESYLATE 20 MG/1
20 CAPSULE ORAL DAILY
Qty: 90 CAPSULE | Refills: 0 | Status: SHIPPED | OUTPATIENT
Start: 2024-09-09 | End: 2024-12-08

## 2024-09-09 ASSESSMENT — ENCOUNTER SYMPTOMS
CONSTIPATION: 0
CHEST TIGHTNESS: 0
SORE THROAT: 0
ABDOMINAL PAIN: 0
WHEEZING: 0
COUGH: 0

## 2024-10-30 DIAGNOSIS — F98.8 ATTENTION DEFICIT DISORDER (ADD) WITHOUT HYPERACTIVITY: ICD-10-CM

## 2024-10-30 RX ORDER — LISDEXAMFETAMINE DIMESYLATE 20 MG/1
20 CAPSULE ORAL DAILY
Qty: 60 CAPSULE | Refills: 0 | Status: SHIPPED | OUTPATIENT
Start: 2024-10-30 | End: 2024-12-29

## 2024-10-30 NOTE — TELEPHONE ENCOUNTER
Spoke to the pharmacy concerning this. They state that they are not able to dispense 90 days of this medication. They can only dispense 30 days at a time. Pt asks that we send the medication to [facility] and she can have a friend mail her the medication.

## 2024-10-30 NOTE — TELEPHONE ENCOUNTER
Patient was calling about Lisdexamfetamine Dimesylate (VYVANSE) 20 MG CAPS medication. Patient stated that when they went to pharmacy on 9/11/2024 to  prescription they only had 30 instead of 90 and was needing a refill but they are currently in [place] and was wanting to talk to someone in office about having prescription sent to a pharmacy in [place]